# Patient Record
(demographics unavailable — no encounter records)

---

## 2025-01-13 NOTE — DEVELOPMENTAL MILESTONES
[Normal Development] : Normal Development [None] : none [Cries with discomfort] : cries with discomfort [Makes brief eye contact] : makes brief eye contact [Calms to adult voice] : calms to adult voice [Reflexively moves arms and legs] : reflexively moves arms and legs [Holds fingers closed] : holds fingers closed [Turns head to side when on stomach] : turns head to side when on stomach [Grasps reflexively] : grasp reflexively

## 2025-01-13 NOTE — HISTORY OF PRESENT ILLNESS
[Born at ___ Wks Gestation] : The patient was born at [unfilled] weeks gestation [C/S] : via  section [[facility]] : at [facility] [(1) _____] : [unfilled] [(5) _____] : [unfilled] [BW: _____] : weight of [unfilled] [Length: _____] : length of [unfilled] [HC: _____] : head circumference of [unfilled] [DW: _____] : Discharge weight was [unfilled] [Age: ___] : [unfilled] year old mother [G: ___] : G [unfilled] [P: ___] : P [unfilled] [RSV vaccine] : RSV vaccine received by mother at least 14 days prior to delivery [GBS] : GBS positive [Rubella (Immune)] : Rubella immune [MBT: ____] : MBT - [unfilled] [Other: ____] : [unfilled] [None] : There are no risk factors [GDM] : GDM [TcB: _____] : Transcutaneous Bilirubin [unfilled] mg/dL [Yes] : Yes [Breast milk] : breast milk [Formula ___ oz/feed] : [unfilled] oz of formula per feed [Hours between feeds ___] : Child is fed every [unfilled] hours [Normal] : Normal [___ voids per day] : [unfilled] voids per day [Frequency of stools: ___] : Frequency of stools: [unfilled]  stools [Yellow] : yellow [Loose] : loose consistency [Seedy] : seedy [In Bassinet/Crib] : sleeps in bassinet/crib [On back] : sleeps on back [No] : No cigarette smoke exposure [Water heater temperature set at <120 degrees F] : Water heater temperature set at <120 degrees F [Rear facing car seat in back seat] : Rear facing car seat in back seat [Carbon Monoxide Detectors] : Carbon monoxide detectors at home [Smoke Detectors] : Smoke detectors at home. [Hepatitis B Vaccine Given] : Hepatitis B vaccine given [NO] : No [HepBsAG] : HepBsAg negative [HIV] : HIV negative [HepC] : Hepatitis C negative [VDRL/RPR (Reactive)] : VDRL/RPR nonreactive [Loose bedding, pillow, toys, and/or bumpers in crib] : no loose bedding, pillow, toys, and/or bumpers in crib [Exposure to electronic nicotine delivery system] : No exposure to electronic nicotine delivery system [de-identified] : ameena [FreeTextEntry1] : This is a 6-day-old female patient that is here today for routine visit following hospital discharge.Mom is attempting to breast-feed and is having difficulty with infant latching on but is supplementing with 1-1/2 ounces of formula after each attempt.  Mom states that the infant is stooling and then has been stooling approximately 3-4 times yellow seedy stools.

## 2025-01-13 NOTE — PHYSICAL EXAM
[Alert] : alert [Normocephalic] : normocephalic [Flat Open Anterior [place]] : flat open anterior fontanelle [PERRL] : PERRL [Red Reflex Bilateral] : red reflex bilateral [Normally Placed Ears] : normally placed ears [Auricles Well Formed] : auricles well formed [Clear Tympanic membranes] : clear tympanic membranes [Light reflex present] : light reflex present [Bony structures visible] : bony structures visible [Patent Auditory Canal] : patent auditory canal [Nares Patent] : nares patent [Palate Intact] : palate intact [Uvula Midline] : uvula midline [Supple, full passive range of motion] : supple, full passive range of motion [Symmetric Chest Rise] : symmetric chest rise [Clear to Auscultation Bilaterally] : clear to auscultation bilaterally [Regular Rate and Rhythm] : regular rate and rhythm [S1, S2 present] : S1, S2 present [+2 Femoral Pulses] : +2 femoral pulses [Soft] : soft [Bowel Sounds] : bowel sounds present [Umbilical Stump Dry, Clean, Intact] : umbilical stump dry, clean, intact [Normal external genitalia] : normal external genitalia [Patent Vagina] : patent vagina [Patent] : patent [Normally Placed] : normally placed [No Abnormal Lymph Nodes Palpated] : no abnormal lymph nodes palpated [Symmetric Flexed Extremities] : symmetric flexed extremities [Startle Reflex] : startle reflex present [Suck Reflex] : suck reflex present [Rooting] : rooting reflex present [Palmar Grasp] : palmar grasp present [Plantar Grasp] : plantar reflex present [Symmetric Ranjit] : symmetric [place] [Acute Distress] : no acute distress [Icteric sclera] : nonicteric sclera [Discharge] : no discharge [Palpable Masses] : no palpable masses [Murmurs] : no murmurs [Tender] : nontender [Distended] : not distended [Hepatomegaly] : no hepatomegaly [Splenomegaly] : no splenomegaly [Clitoromegaly] : no clitoromegaly [Lebron-Ortolani] : negative Lebron-Ortolani [Spinal Dimple] : no spinal dimple [Tuft of Hair] : no tuft of hair [Jaundice] : not jaundice

## 2025-01-13 NOTE — PHYSICAL EXAM
[Alert] : alert [Normocephalic] : normocephalic [Flat Open Anterior [place]] : flat open anterior fontanelle [PERRL] : PERRL [Red Reflex Bilateral] : red reflex bilateral [Normally Placed Ears] : normally placed ears [Auricles Well Formed] : auricles well formed [Clear Tympanic membranes] : clear tympanic membranes [Light reflex present] : light reflex present [Bony structures visible] : bony structures visible [Patent Auditory Canal] : patent auditory canal [Nares Patent] : nares patent [Palate Intact] : palate intact [Uvula Midline] : uvula midline [Supple, full passive range of motion] : supple, full passive range of motion [Symmetric Chest Rise] : symmetric chest rise [Clear to Auscultation Bilaterally] : clear to auscultation bilaterally [Regular Rate and Rhythm] : regular rate and rhythm [S1, S2 present] : S1, S2 present [+2 Femoral Pulses] : +2 femoral pulses [Soft] : soft [Bowel Sounds] : bowel sounds present [Umbilical Stump Dry, Clean, Intact] : umbilical stump dry, clean, intact [Normal external genitalia] : normal external genitalia [Patent Vagina] : patent vagina [Patent] : patent [Normally Placed] : normally placed [No Abnormal Lymph Nodes Palpated] : no abnormal lymph nodes palpated [Symmetric Flexed Extremities] : symmetric flexed extremities [Startle Reflex] : startle reflex present [Suck Reflex] : suck reflex present [Rooting] : rooting reflex present [Palmar Grasp] : palmar grasp present [Plantar Grasp] : plantar reflex present [Symmetric Ranjit] : symmetric [place] [Acute Distress] : no acute distress [Icteric sclera] : nonicteric sclera [Discharge] : no discharge [Palpable Masses] : no palpable masses [Murmurs] : no murmurs [Tender] : nontender [Distended] : not distended [Hepatomegaly] : no hepatomegaly [Splenomegaly] : no splenomegaly [Clitoromegaly] : no clitoromegaly [Lebron-Ortolani] : negative Lebron-Ortolani [Spinal Dimple] : no spinal dimple [Tuft of Hair] : no tuft of hair [Jaundice] : not jaundice

## 2025-01-13 NOTE — HISTORY OF PRESENT ILLNESS
[Born at ___ Wks Gestation] : The patient was born at [unfilled] weeks gestation [C/S] : via  section [[facility]] : at [facility] [(1) _____] : [unfilled] [(5) _____] : [unfilled] [BW: _____] : weight of [unfilled] [Length: _____] : length of [unfilled] [HC: _____] : head circumference of [unfilled] [DW: _____] : Discharge weight was [unfilled] [Age: ___] : [unfilled] year old mother [G: ___] : G [unfilled] [P: ___] : P [unfilled] [RSV vaccine] : RSV vaccine received by mother at least 14 days prior to delivery [GBS] : GBS positive [Rubella (Immune)] : Rubella immune [MBT: ____] : MBT - [unfilled] [Other: ____] : [unfilled] [None] : There are no risk factors [GDM] : GDM [TcB: _____] : Transcutaneous Bilirubin [unfilled] mg/dL [Yes] : Yes [Breast milk] : breast milk [Formula ___ oz/feed] : [unfilled] oz of formula per feed [Hours between feeds ___] : Child is fed every [unfilled] hours [Normal] : Normal [___ voids per day] : [unfilled] voids per day [Frequency of stools: ___] : Frequency of stools: [unfilled]  stools [Yellow] : yellow [Loose] : loose consistency [Seedy] : seedy [In Bassinet/Crib] : sleeps in bassinet/crib [On back] : sleeps on back [No] : No cigarette smoke exposure [Water heater temperature set at <120 degrees F] : Water heater temperature set at <120 degrees F [Rear facing car seat in back seat] : Rear facing car seat in back seat [Carbon Monoxide Detectors] : Carbon monoxide detectors at home [Smoke Detectors] : Smoke detectors at home. [Hepatitis B Vaccine Given] : Hepatitis B vaccine given [NO] : No [HepBsAG] : HepBsAg negative [HIV] : HIV negative [HepC] : Hepatitis C negative [VDRL/RPR (Reactive)] : VDRL/RPR nonreactive [Loose bedding, pillow, toys, and/or bumpers in crib] : no loose bedding, pillow, toys, and/or bumpers in crib [Exposure to electronic nicotine delivery system] : No exposure to electronic nicotine delivery system [de-identified] : ameena [FreeTextEntry1] : This is a 6-day-old female patient that is here today for routine visit following hospital discharge.Mom is attempting to breast-feed and is having difficulty with infant latching on but is supplementing with 1-1/2 ounces of formula after each attempt.  Mom states that the infant is stooling and then has been stooling approximately 3-4 times yellow seedy stools.

## 2025-01-13 NOTE — DISCUSSION/SUMMARY
[Normal Growth] : growth [Normal Development] : developmental [No Elimination Concerns] : elimination [Continue Regimen] : feeding [No Skin Concerns] : skin [Normal Sleep Pattern] : sleep [None] : no known medical problems [Anticipatory Guidance Given] : Anticipatory guidance addressed as per the history of present illness section [No Medications] : ~He/She~ is not on any medications [Parent/Guardian] : Parent/Guardian [FreeTextEntry1] : This is a 6 day old  female infant  here for their initial visit to our practice following hospital discharge. I Recommended  breastfeeding, 8-12 feedings per day. Mother should continue prenatal vitamins and avoid alcohol. If formula is needed, recommend iron-fortified formula every 2-3 hrs.until her breast milk is established . Infant to be started on Vit D iml /day   When in car, patient should be in rear-facing car seat in back seat. Air dry umbilical stump and continue sponge bathing 3-4 times per week or as needed until cord falls off. Put baby to sleep on back, in own crib with no loose or soft bedding. Limit baby's exposure to others, especially those with fever or unknown vaccine status. Child's physical examination today is within normal limits mild icteric tinge noted bilirubin 7.7.  If infant is Jaundice , place infant in indirect  sunlight to help with alleviating the jaundice . Infant to follow up in 48 hrs for a weight and a color check

## 2025-01-16 NOTE — HISTORY OF PRESENT ILLNESS
[FreeTextEntry6] : Taking 1.5 to 2.5 oz enfamil or breast milk. Mom not getting much when she pumps. Prefers one side over the other when taking breast. Taking vitamin D. She is eating every 2 hours or so. Passing plenty of urine and stool diapers, stools have transitioned to yellow/seedy. She is up over 3 oz since her visit 3 days ago.  Parents have concerns about intermittent tachypneic episodes that seem to happen more when she is just fed and drifting off to sleep. She will breathe faster for sustained amounts of time (varying between 20 seconds to 2 mins to 2 hours at the most). They tried suctioning her nares due to slight congestion appreciated. She has not had fever, parents are checking temps. No hx of respiratory isses after birth.  She also makes a squeaking sound when she is crying towards the end of the cry.

## 2025-01-16 NOTE — REVIEW OF SYSTEMS
[Negative] : Genitourinary [Nasal Congestion] : nasal congestion [Tachypnea] : tachypneic [Fussy] : not fussy [Fever] : no fever [Cough] : no cough [Spitting Up] : no spitting up

## 2025-01-16 NOTE — DISCUSSION/SUMMARY
[FreeTextEntry1] : 9 day female here for weight and color check. Excellent weight gain, bilirubin trending down nicely.  Suspect laryngomalacia or tracheomalacia based on the squeaking sounds she is making when crying (noted in a video mom has as well as noted in the office too). The transient/intermittent tachypnea is likely also related and can be resolved with position change. She is usually sleeping on her back when this happens. Advised suctioning nares and moving her to on parent's chest (belly to belly) to see if this snaps her out of it. "Floppy vocal cord" is my suspicion. Lungs are clear. If this tachypnea persists will see if pulmonary referral is needed.  Total time dedicated to this patient's visit includes preparing to see patient (reviewing chart, any pertinent labs/consults, etc.), obtaining and/or reviewing separately obtained history from patient and parent, performing medical examination, evaluation, counseling and educating patient/parent, ordering any needed medications and/or labs, documenting clinical information in the electronic record, reviewing any results subsequent to the orders placed during visit and discussing with patient/parent. Total time spent: 30 minutes.

## 2025-01-16 NOTE — PHYSICAL EXAM
[NL] : warm, clear [Tired appearing] : not tired appearing [Lethargic] : not lethargic [Toxic] : not toxic [Stridor] : no stridor [FreeTextEntry7] : intermittent tachypnea noted when she was in mom's arms sleeping/smiling in her sleep, it stopped immediately when changing her position and bringing her to the table to examine. No belly breathing no retractions etc [FreeTextEntry9] : umbilical cord dry intact

## 2025-01-21 NOTE — PHYSICAL EXAM
[Clear to Auscultation Bilaterally] : clear to auscultation bilaterally [NL] : warm, clear [FreeTextEntry4] : Mom concerned about noisy breathing not present at this exam from description or from video shown his normal  nasal congestion alleviated with aspirator.  Mom advised to do this before each feed. [de-identified] : Sucking blister noted on upper lip.  Mom concerned whether this could be infectious advised mom that this is very common in babies of this age from sucking bottles nipples [FreeTextEntry8] : Questionable 1 out of 2 murmur audible.  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  Referred to cardiology for further evaluation of audible murmur. [FreeTextEntry7] : Respiratory rate 98% room air infant appears to be breathing comfortably there are no rales rhonchi or wheezing.  Infant will go through short bouts of tachypnea parents were reassured that this is normal for this age that they will have a period of transient tachypnea of the .  Since child is pink not labored and these episodes are brief is still considered normal at this age.

## 2025-01-21 NOTE — DISCUSSION/SUMMARY
[FreeTextEntry1] : This is a 14-day-old baby that is here today for evaluation of lesion on her upper lip and intermittent rapid breathing.  Mom is also concerned about occasional nasal congestion.  Infant is otherwise eating well color is pink and she remains afebrile.  Her physical examination today is within normal limits .  The small lesion on the upper lip is consistent with a sucking blister.  Parents were advised that this is common in babies and no further intervention needed at this time since will resolve in time.  Regarding occasional nasal congestion child is breathing well during exam no sign of congestion at today's visit.  Advised mom however to use nasal aspirator prior to feeds to help with the nasal congestion if present.  Regarding the episodes of tachypnea observed briefly during this exam otherwise child is breathing very comfortably no retractions noted .  Mom showed episodes of child having rapid breathing these episodes are intermittent and short lasting.  And child appears restful and in no distress.  Mom is advised that this is consistent with transient tachypnea of the  and tends to resolve over the next few weeks.  Will monitor for present time.  Mom is concerned about possible cardiac issues as cause for this episodes of tachypnea she herself has cardiac issues and wishes to have child evaluated.  On exam there is a questionable 1 out of 2 murmur audible therefore child is being referred to cardiology for further evaluation.  Regarding feeds parents were advised to increase to 3 ounces per feed every 3-4 hours.  Mom is still attempting to increase her milk supply she was advised to nurse minimum 8-10 times a day in order to establish a milk supply sufficient enough to nurse baby.  Patient has follow-up visit in 48 hours for lactation consultant. Total time dedicated to this patient's visit includes preparing to see patient  obtaining and/or reviewing separately obtained history from patient and parent,  discussing symptoms ,  physical exam and medication recommendations Time :30

## 2025-01-21 NOTE — PHYSICAL EXAM
[Clear to Auscultation Bilaterally] : clear to auscultation bilaterally [NL] : warm, clear [FreeTextEntry4] : Mom concerned about noisy breathing not present at this exam from description or from video shown his normal  nasal congestion alleviated with aspirator.  Mom advised to do this before each feed. [de-identified] : Sucking blister noted on upper lip.  Mom concerned whether this could be infectious advised mom that this is very common in babies of this age from sucking bottles nipples [FreeTextEntry7] : Respiratory rate 98% room air infant appears to be breathing comfortably there are no rales rhonchi or wheezing.  Infant will go through short bouts of tachypnea parents were reassured that this is normal for this age that they will have a period of transient tachypnea of the .  Since child is pink not labored and these episodes are brief is still considered normal at this age. [FreeTextEntry8] : Questionable 1 out of 2 murmur audible.  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  Referred to cardiology for further evaluation of audible murmur.

## 2025-01-21 NOTE — HISTORY OF PRESENT ILLNESS
[FreeTextEntry6] : 14 d/o patient presents today for congestion, cough, and heavy/fast breathing, x4 days. Patient also has something on upper lip that parents are concerned about. No fevers. Elevated temperature at 99.3F in office.

## 2025-01-23 NOTE — CONSULT LETTER
[Today's Date] : [unfilled] [Name] : Name: [unfilled] [] : : ~~ [Today's Date:] : [unfilled] [Dear  ___:] : Dear Dr. [unfilled]: [Consult] : I had the pleasure of evaluating your patient, [unfilled]. My full evaluation follows. [Consult - Single Provider] : Thank you very much for allowing me to participate in the care of this patient. If you have any questions, please do not hesitate to contact me. [Sincerely,] : Sincerely, [FreeTextEntry4] : Neisha Younger MD [FreeTextEntry5] : [address], [place] [address] [de-identified] : Anuj Doran MD, FAAP  and Systems Chief, Pediatric Cardiology [facility] [address], [address] [place] [address] Office: [phone] Fax: [phone]

## 2025-01-23 NOTE — PHYSICAL EXAM
[General Appearance - Alert] : alert [General Appearance - In No Acute Distress] : in no acute distress [General Appearance - Well Nourished] : well nourished [General Appearance - Well Developed] : well developed [General Appearance - Well-Appearing] : well appearing [Appearance Of Head] : the head was normocephalic [Facies] : there were no dysmorphic facial features [Sclera] : the conjunctiva were normal [Outer Ear] : the ears and nose were normal in appearance [Examination Of The Oral Cavity] : mucous membranes were moist and pink [Normal Chest Appearance] : the chest was normal in appearance [Apical Impulse] : quiet precordium with normal apical impulse [Heart Rate And Rhythm] : normal heart rate and rhythm [Heart Sounds] : normal S1 and S2 [Heart Sounds Gallop] : no gallops [Heart Sounds Pericardial Friction Rub] : no pericardial rub [Edema] : no edema [Arterial Pulses] : normal upper and lower extremity pulses with no pulse delay [Heart Sounds Click] : no clicks [Capillary Refill Test] : normal capillary refill [Bowel Sounds] : normal bowel sounds [Abdomen Soft] : soft [Nondistended] : nondistended [Abdomen Tenderness] : non-tender [Nail Clubbing] : no clubbing  or cyanosis of the fingers [Motor Tone] : normal muscle strength and tone [Cervical Lymph Nodes Enlarged Anterior] : The anterior cervical nodes were normal [Cervical Lymph Nodes Enlarged Posterior] : The posterior cervical nodes were normal [] : no rash [Skin Lesions] : no lesions [Skin Turgor] : normal turgor [Demonstrated Behavior - Infant Nonreactive To Parents] : interactive [Mood] : mood and affect were appropriate for age [Demonstrated Behavior] : normal behavior [III] : a grade 3/6   [LUSB] : LUSB [Back] : the murmur was transmitted to the back

## 2025-01-23 NOTE — PROCEDURE
[FreeTextEntry1] : Flexible fiberoptic laryngoscopy [FreeTextEntry2] : Noisy breathing [FreeTextEntry3] :  After verbal consent was obtained, the flexible fiberoptic laryngoscope was passed and the following was seen:   Nasal passage: clear without lesions or drainage, choanae patent bilaterally Nasopharynx: Adenoids with <10% obstruction, normal closure of the velopharyngeal sphincter Supraglottis: Normal shape and mucosalization of the epiglottis, normal appearing aryepiglottic folds and arytenoids Glottis: Normal mobility of right and left vocal cords. No vocal cord lesions. Subglottis: Subglottis as able to visualize appears patent. Hypopharynx: Normal appearing post-cricoid region without pooling of secretions.   The patient tolerated the procedure well without complications.

## 2025-01-23 NOTE — CONSULT LETTER
[Dear  ___] : Dear  [unfilled], [Courtesy Letter:] : I had the pleasure of seeing your patient, [unfilled], in my office today. [Please see my note below.] : Please see my note below. [Sincerely,] : Sincerely, [FreeTextEntry3] : Jocelynn Freedman [place] [address] Tel [phone] Fax [phone]

## 2025-01-23 NOTE — REVIEW OF SYSTEMS
[Nl] : no feeding issues at this time. [Breastmilk] : Breastmilk ~M [___ Formula] : [unfilled] Formula  [___ ounces/feeding] : ~MATT pino/feeding [___ Times/day] : [unfilled] times/day [Acting Fussy] : not acting ~L fussy [Fever] : no fever [Wgt Loss (___ Lbs)] : no recent weight loss [Pallor] : not pale [Discharge] : no discharge [Redness] : no redness [Nasal Discharge] : no nasal discharge [Nasal Stuffiness] : no nasal congestion [Stridor] : no stridor [Cyanosis] : no cyanosis [Edema] : no edema [Diaphoresis] : not diaphoretic [Tachypnea] : not tachypneic [Wheezing] : no wheezing [Cough] : no cough [Being A Poor Eater] : not a poor eater [Vomiting] : no vomiting [Diarrhea] : no diarrhea [Decrease In Appetite] : appetite not decreased [Fainting (Syncope)] : no fainting [Dec Consciousness] :  no decrease in consciousness [Seizure] : no seizures [Hypotonicity (Flaccid)] : not hypotonic [Refusal to Bear Wgt] : normal weight bearing [Puffy Hands/Feet] : no hand/feet puffiness [Rash] : no rash [Hemangioma] : no hemangioma [Jaundice] : no jaundice [Wound problems] : no wound problems [Bruising] : no tendency for easy bruising [Swollen Glands] : no lymphadenopathy [Enlarged [place]] : the fontanelle was not enlarged [Hoarse Cry] : no hoarse cry [Failure To Thrive] : no failure to thrive [Vaginal Discharge] : no vaginal discharge [Ambiguous Genitals] : genitals not ambiguous [Dec Urine Output] : no oliguria

## 2025-01-23 NOTE — ASSESSMENT
[FreeTextEntry1] : We reviewed laryngoscopy findings which demonstrate normal supraglottic and glottic structures. No significant laryngomalacia noted on exam today. Overall well-appearing . Continue to monitor weight gain. Follow up with ENT as needed.

## 2025-01-23 NOTE — PHYSICAL EXAM
[Normal Gait and Station] : normal gait and station [Normal muscle strength, symmetry and tone of facial, head and neck musculature] : normal muscle strength, symmetry and tone of facial, head and neck musculature [Normal] : no cervical lymphadenopathy [Exposed Vessel] : left anterior vessel not exposed [Increased Work of Breathing] : no increased work of breathing with use of accessory muscles and retractions [de-identified] : no tongue tie

## 2025-01-23 NOTE — HISTORY OF PRESENT ILLNESS
[FreeTextEntry1] : I had the pleasure of seeing PHIL VERONICA in the cardiology office on 01/23/2025 for an initial consultation. As you know, PHIL is a 2 week old girl who was referred to cardiology for a heart murmur. The murmur was first diagnosed during a routine pediatric visit a few days ago. This had not been heard in the nursery. PHIL was not ill or febrile at the time of that visit.   She has been feeding by breast but does at time take longer to feed. Additionally she has episodically increased rate and work of breathing. This is unpredictable and can last for a while but does resolve. She has seen ENT with no etiology.  There has been no cyanosis, excessive diaphoresis, unexplained irritability, or syncope.  There has been no chest pain, cyanosis, excessive diaphoresis, unexplained irritability, or syncope.  There is no history of sudden early death, syncope, pacemakers cardiomyopathy or heart transplant or other early onset CV issues in the family. Mom had SVT as an adult.

## 2025-01-23 NOTE — REASON FOR VISIT
[Initial Evaluation] : an initial evaluation for [Parents] : parents [FreeTextEntry2] : noisy breathing

## 2025-01-23 NOTE — DISCUSSION/SUMMARY
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 The murmur is due to physiologic peripheral pulmonic stenosis (PPS).  I discussed at length with the family that this is a normal finding in infants, and that the murmur of PPS typically resolves around 6-9 months of age. There are rare exceptions to this and we will follow-up in a year.  I explained that it is hemodynamically insignificant and is not the cause of her symptom of rapid breathing. Of note we did hear a bit of wheezing today albeit in the setting of having had an ENT scope this morning.   In addition to the PPS we did make incidental note today of an anomalous origin of the right coronary artery from the left cusp.  This condition is now discovered more frequently around the world as echo machine resolution is constantly improving and protocols are more sophisticated. There is controversy surrounding the management of children, adolescents, and young adults with anomalous RCA (AORCA) who are diagnosed serendipitously. While we know that AORCA is associated with a slight increased risk of sudden death in adolescents and young adults, many individuals with this anomaly do not experience ischemic symptoms or show any evidence of ischemia and the anomalous coronary is diagnosed serendipitously later in life. Ischemic events associated with anomalous coronary arteries almost always occur during or just after significant exertion. We currently do not know how to risk stratify those with AORCA who may develop true ischemic symptoms or signs at a young age from those asymptomatic individuals who would never have known they had this, had an imaging test not been performed.   We generally do not recommend elective repair of an asymptomatic child with AORCA and we do not generally repair anomalous right coronary until school age (i.e., > 10 years of age). However, the risks of surgery for a child with asymptomatic AORCA might outweigh the lifetime risk of an adverse event from it. As well, there are risks associated with any surgery and also a surgery for which we do not know the long-term outcome.  I explained to the family at length my findings as above. The family verbalized understanding, and all questions were answered.   From a cardiac standpoint there are no physical limitations, no contraindications to any medications she should require, no cardiac contraindications to anesthesia or surgical procedures, and no requirement for SBE prophylaxis prior to procedures.   From a CV perspective all routine vaccinations including flu vaccination are recommended  Follow-up is recommended in 1 year including an ECG and Echocardiogram.  [Needs SBE Prophylaxis] : [unfilled] does not need bacterial endocarditis prophylaxis [May participate in all age-appropriate activities] : [unfilled] May participate in all age-appropriate activities. [RSV prophylaxis is recommended] : RSV prophylaxis is recommended. [Influenza vaccine is recommended] : Influenza vaccine is recommended

## 2025-01-23 NOTE — CARDIOLOGY SUMMARY
[Today's Date] : [unfilled] [FreeTextEntry1] : Normal sinus rhythm, normal QRS axis, normal intervals, possible LVH  no pre-excitation, no ST segment or T wave abnormalities.  [FreeTextEntry2] : Personal preliminary review of the echocardiogram revealed normal cardiac architecture, and normal cardiac anatomy. 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 Cardiac dimensions and biventricular function were normal. There was no pericardial effusion. Final report pending.

## 2025-01-23 NOTE — HISTORY OF PRESENT ILLNESS
[de-identified] : Janet is a 16 day old ex-FT girl here for evaluation of noisy breathing.  Here with parents who provide history.   Parents have noticed intermittent fast paced breathing since home from the hospital.  Can occur after feeds but also can happen at random times throughout the day.  PCP told them it was transient tachypnea of .  Parents notice noisy squeak at the end of breathing at times, particularly when crying or worked up.  No retractions. No increased work of breathing. No apneas.  Eating well - both from bottle and breast.  Gaining weight appropriately.   No other otolaryngology concerns today.

## 2025-01-30 NOTE — PHYSICAL EXAM
[Playful] : playful [Wheezing] : no wheezing [Rales] : no rales [Tachypnea] : no tachypnea [NL] : warm, clear [FreeTextEntry1] : smiling [de-identified] : pink

## 2025-01-30 NOTE — PHYSICAL EXAM
[Playful] : playful [Wheezing] : no wheezing [Rales] : no rales [Tachypnea] : no tachypnea [NL] : warm, clear [FreeTextEntry1] : smiling [de-identified] : pink

## 2025-01-30 NOTE — REVIEW OF SYSTEMS
[Fussy] : not fussy [Fever] : no fever [Nasal Congestion] : nasal congestion [Appetite Changes] : no appetite changes [Spitting Up] : spitting up [Negative] : Genitourinary [FreeTextEntry1] : sneezing

## 2025-01-30 NOTE — DISCUSSION/SUMMARY
[FreeTextEntry1] : 23 day female with noisy breathing/nasal congestion. Reflux is likely playing a role in her congestion but the spitting up does not seem to bother her in the least therefore not considering medication or GI referral at this time. She is gaining weight very well. Advised trialing a slower flow nipple (currently on size 2) and paced bottle feeding discussed. Not looking ill at all today, I have no suspicion for infectious process or illness. Follow up if worsening symptoms. Reassurance provided.

## 2025-01-30 NOTE — HISTORY OF PRESENT ILLNESS
[FreeTextEntry6] : 23 d/o presents with ongoing nasal congestion (mom thinks worse, dad thinks she is better/fine), occasional cough, sneezing. Afebrile in office. She gained 1 lb 3 oz since her last office visit 9 days ago. She is using a size 2 [place] nipple. Taking EBM and formula by bottle. She spits up sometimes but is not bothered by it. The noisy breathing is alleviated by change in positioning. Mom is also trying steam showers and suctioning her. One time got a bloody piece of nasal discharge, mostly whitish but sometimes a little green too. No one sick in the home, no fevers. She is happy and smiling, not arching or bothered by any of the spitting up. Mom sometimes hears a whistling sound she is worried could be wheezing.

## 2025-02-07 NOTE — PHYSICAL EXAM
[Alert] : alert [Normocephalic] : normocephalic [Flat Open Anterior [place]] : flat open anterior fontanelle [PERRL] : PERRL [Red Reflex Bilateral] : red reflex bilateral [Normally Placed Ears] : normally placed ears [Auricles Well Formed] : auricles well formed [Clear Tympanic membranes] : clear tympanic membranes [Light reflex present] : light reflex present [Bony landmarks visible] : bony landmarks visible [Nares Patent] : nares patent [Palate Intact] : palate intact [Uvula Midline] : uvula midline [Supple, full passive range of motion] : supple, full passive range of motion [Symmetric Chest Rise] : symmetric chest rise [Clear to Auscultation Bilaterally] : clear to auscultation bilaterally [Regular Rate and Rhythm] : regular rate and rhythm [S1, S2 present] : S1, S2 present [+2 Femoral Pulses] : +2 femoral pulses [Bowel Sounds] : bowel sounds present [Normal external genitailia] : normal external genitalia [Patent Vagina] : vagina patent [Normally Placed] : normally placed [Symmetric Flexed Extremities] : symmetric flexed extremities [Startle Reflex] : startle reflex present [Suck Reflex] : suck reflex present [Rooting] : rooting reflex present [Palmar Grasp] : palmar grasp reflex present [Plantar Grasp] : plantar grasp reflex present [Symmetric [place]] : symmetric [place] [Soft] : soft [Non Tender] : non tender [Mobile] : mobile [< 1 cm Lymph Nodes Palpated in the following Regions:] : <1 cm lymph nodes palpated in the following regions: [Inguinal] : inguinal [Acute Distress] : no acute distress [Discharge] : no discharge [Palpable Masses] : no palpable masses [Murmurs] : no murmurs [Tender] : nontender [Distended] : not distended [Hepatomegaly] : no hepatomegaly [Splenomegaly] : no splenomegaly [Clitoromegaly] : no clitoromegaly [Lebron-Ortolani] : negative Lebron-Ortolani [Spinal Dimple] : no spinal dimple [Tuft of Hair] : no tuft of hair [Jaundice] : no jaundice [Rash and/or lesion present] : no rash/lesion [FreeTextEntry9] : small reducible umbilical hernia

## 2025-02-07 NOTE — DISCUSSION/SUMMARY
[Normal Growth] : growth [Normal Development] : development  [No Elimination Concerns] : elimination [Continue Regimen] : feeding [No Skin Concerns] : skin [Normal Sleep Pattern] : sleep [None] : no medical problems [Anticipatory Guidance Given] : Anticipatory guidance addressed as per the history of present illness section [Parental Well-Being] : parental well-being [Family Adjustment] : family adjustment [Feeding Routines] : feeding routines [Infant Adjustment] : infant adjustment [Safety] : safety [Age Approp Vaccines] : Age appropriate vaccines administered [No Medications] : ~He/She~ is not on any medications [Parent/Guardian] : Parent/Guardian [] : The components of the vaccine(s) to be administered today are listed in the plan of care. The disease(s) for which the vaccine(s) are intended to prevent and the risks have been discussed with the caretaker.  The risks are also included in the appropriate vaccination information statements which have been provided to the patient's caregiver.  The caregiver has given consent to vaccinate. [FreeTextEntry1] : 1 month female here for well visit. Normal growth and development observed unless otherwise listed. Recommend exclusive breastfeeding, 8-12 feedings per day. Mother should continue prenatal vitamins and avoid alcohol. If formula is needed, recommend iron-fortified formulations, 2-4 oz every 2-3 hrs. When in car, patient should be in rear-facing car seat in back seat. Put baby to sleep on back, in own crib with no loose or soft bedding. Help baby to develop sleep and feeding routines. May offer pacifier if needed. Start tummy time when awake. Limit baby's exposure to others, especially those with fever or unknown vaccine status. Parents counseled to call if temperature >100.4 degrees F. Return to office for 2 month well visit or sooner if needed.   Vaccine Information Sheet(s) given for appropriate vaccines. The components of the vaccine(s) to be administered today are listed in the plan of care. We discussed common side effects and education on the vaccine was provided including the disease(s) for which the vaccine(s) are intended to prevent as well as any risks. Denies any questions. Consent was given to vaccinate. Hep B given in right thigh.  Has pulmonologist appt next week to follow up on the intermittent tachypnea/noisy breathing. Has already seen ENT and cardiology.  Refer to surgery to follow up on left inguinal hernia (parents have noticed it flare back up and reduce since the ER visit a few days ago. Photos shown to me. Not currently appreciated but small lymph node that was noted on ultrasound is palpable in left inguinal area. Umbilical hernia noted. S/s of when to report to ER discussed.

## 2025-02-07 NOTE — HISTORY OF PRESENT ILLNESS
[Normal] : Normal [No] : No cigarette smoke exposure [Water heater temperature set at <120 degrees F] : Water heater temperature set at <120 degrees F [Rear facing car seat in back seat] : Rear facing car seat in back seat [Carbon Monoxide Detectors] : Carbon monoxide detectors at home [Smoke Detectors] : Smoke detectors at home. [Parents] : parents [Formula ___ oz/feed] : [unfilled] oz of formula per feed [Hours between feeds ___] : Child is fed every [unfilled] hours [___ voids per day] : [unfilled] voids per day [Frequency of stools: ___] : Frequency of stools: [unfilled]  stools [Green/brown] : green/brown [Yellow] : yellow [Pasty] : pasty [Loose] : loose consistency [In Bassinet/Crib] : sleeps in bassinet/crib [On back] : sleeps on back [Pacifier use] : Pacifier use [Co-sleeping] : no co-sleeping [Loose bedding, pillow, toys, and/or bumpers in crib] : no loose bedding, pillow, toys, and/or bumpers in crib [Exposure to electronic nicotine delivery system] : No exposure to electronic nicotine delivery system [At risk for exposure to TB] : Not at risk for exposure to Tuberculosis  [de-identified] : Primarily 85% formula. On enfamil formula blue label. Some breastmilk [FreeTextEntry1] : 1 month old female here for well visit. Denies any specialist visits, ER visits, hospitalizations or serious injuries since last well visit unless listed below.  Went to ER a few days ago for left groin mass. Possible self-resolved left inguinal hernia vs ultrasound suggested a lymph node possible as well. Has pulmonologist appointment next week ENT-- noisy breathing, did not feel she had laryngomalacia Cardiology-- Sent due to intermittent tachypnea but also a possible murmur. Has an innocent murmur due to peripheral pulmonic stenosis of the , physiologic and not a concern. 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 Follow up in 1 year. No restrictions

## 2025-02-07 NOTE — DEVELOPMENTAL MILESTONES
[Normal Development] : Normal Development [Not Passed] : not passed [FreeTextEntry1] : Has a therapist, had anxiety before baby but more anxiety now, considering seeing psychiatrist for medication. Encouraged mom to do so if needed and to reach out to us for assistance [FreeTextEntry2] : 12

## 2025-02-10 NOTE — CONSULT LETTER
[Dear  ___] : Dear  [unfilled], [Consult Letter:] : I had the pleasure of evaluating your patient, [unfilled]. [Please see my note below.] : Please see my note below. [Consult Closing:] : Thank you very much for allowing me to participate in the care of this patient.  If you have any questions, please do not hesitate to contact me. [Sincerely,] : Sincerely, [FreeTextEntry3] : Mitzi Conde NP

## 2025-02-10 NOTE — REVIEW OF SYSTEMS
[Nasal Congestion] : nasal congestion [Tachypnea] : tachypneic [Reflux] : reflux [Immunizations are up to date] : Immunizations are up to date [Fever] : no fever [Fatigue] : no fatigue [Chills] : no chills [Poor Appetite] : no poor appetite [Frequent URIs] : no frequent upper respiratory infections [Snoring] : no snoring [Apnea] : no apnea [Frequent Croup] : no frequent croup [Sinus Problems] : no sinus problems [Postnasl Drip] : no postnasal drip [Recurrent Ear Infections] : no recurrent ear infections [Heart Disease] : no heart disease [Edema] : no edema [Diaphoresis] : not diaphoretic [Pulmonary Hypertension] : pulmonary hypertension [Cough] : no cough [Bronchiolitis] : no bronchiolitis [Pneumonia] : no pneumonia [Hemoptysis] : no hemoptysis [Seizure] : no seizures [Failure To Thrive] : no failure to thrive [FreeTextEntry5] : b/l PPS. Anomalous aortic origin of the RCA [FreeTextEntry6] : Possible wheeze heard by mom  [FreeTextEntry7] : No overt s/sx of dysphagia, reflux symptoms improving [de-identified] : cradle cap

## 2025-02-10 NOTE — PHYSICAL EXAM
[Well Nourished] : well nourished [Well Developed] : well developed [Alert] : ~L alert [Active] : active [Normal Breathing Pattern] : normal breathing pattern [No Respiratory Distress] : no respiratory distress [No Allergic Shiners] : no allergic shiners [No Drainage] : no drainage [No Conjunctivitis] : no conjunctivitis [Nasal Mucosa Non-Edematous] : nasal mucosa non-edematous [No Nasal Drainage] : no nasal drainage [No Sinus Tenderness] : no sinus tenderness [No Oral Pallor] : no oral pallor [No Oral Cyanosis] : no oral cyanosis [No Stridor] : no stridor [Absence Of Retractions] : absence of retractions [Symmetric] : symmetric [Good Expansion] : good expansion [No Acc Muscle Use] : no accessory muscle use [Good aeration to bases] : good aeration to bases [Equal Breath Sounds] : equal breath sounds bilaterally [No Crackles] : no crackles [No Rhonchi] : no rhonchi [No Wheezing] : no wheezing [Normal Sinus Rhythm] : normal sinus rhythm [Soft, Non-Tender] : soft, non-tender [Full ROM] : full range of motion [No Clubbing] : no clubbing [Capillary Refill < 2 secs] : capillary refill less than two seconds [No Cyanosis] : no cyanosis [No Contractures] : no contractures [No Abnormal Focal Findings] : no abnormal focal findings [Normal Muscle Tone And Reflexes] : normal muscle tone and reflexes [No Birth Marks] : no birth marks [FreeTextEntry3] : normal external exam  [FreeTextEntry6] : periodic breathing during feed, will breathe faster for 5 seconds and slow rate to normal. Comfortable throughout feed, no retractions [FreeTextEntry9] : +inguinal hernia  [de-identified] : mottled skin [de-identified] : easily comforted in parents arms [de-identified] : +cradle cap

## 2025-02-10 NOTE — ASSESSMENT
[FreeTextEntry1] : PHIL VERONICA is a 1 month old female born at 38 weeks via . No complications at birth, discharged home from the hospital with mother, no NICU stay. Did not require respiratory support. NBS normal. Since discharge from the hospital, mother notes intermittent periods of tachypnea and noisy breathing with possible wheezing, mainly with feeds and shortly after. Can occur in periods of deep sleep as well. Overall, parents believe her episodic breathing and episodes of tachypnea are improving over time.   Noisy breathing: Evaluated by ENT at the end of January for noisy breathing episodes. Exam including in office scope was unremarkable. No increased WOB or retractions. No apneas or color changes. She is eating well and gaining weight. Cannot rule out mild malacia. Will order baseline CXR and Airway Fluoroscopy to evaluate for dynamic collapse of the airway. As the baby is growing and gaining weight, the malacia is likely mild and can be monitored conservatively over time. Noisy breathing can be associated with reflux as well, especially as episodes occur around feeds mainly. Working with PCP and lactation consultant on feeding regimen. Minimal projectile vomiting episodes. No evidence of reflux on ENT exam. Continue to work with PCP and Lactation to optimize feeding regimen- symptoms currently improved with slow flow nipple and frequent burps. Reviewed reflux precautions with parents today. Will order nebulizer to administer normal saline as needed for congestion, instructed parents to suction nares after treatments as needed.   Intermittent tachypnea around feeds and occasionally at night, consistent with periodic breathing of the , which is a normal variant. Fast breathing last for a few seconds, resolves, then can recur for up to 10 minutes at a time. No periods of persistent tachypnea. No retractions, color changes, or apneas. Parents have Owl pulse ox at home. Father notes that her O2 sats on average are between 96-99% awake and asleep. Once noted desat to 89% for a second that quickly self resolved. Cardio evaluation for murmur at the end of January reassuring. She has bilateral peripheral pulmonic stenosis, a normal variant in the  period. Echo also notable for anomalous origin of RCA. Normal EKG. Cardiologist provided reassurance that at this time, her symptoms and exam are not concerning for cardiac pathology. She is gaining weight appropriately. Able to watch her feed during our visit- took 2 2oz bottles with a break for burping. Periodic breathing with minimal belly breathing noted, no retractions. No stridor or wheeze appreciated. No color changes or apneas. Appears comfortable throughout feed. Provided reassurance to parents today. Mother is more concerned than father, states that overall she feels better about her periodic breathing and feels comfortable with her at home. Reviewed signs and symptoms of respiratory distress, when to seek care, ED return precautions today. Should her tachypnea become persistent, other causes including ILD will need to be considered.   Of note, mother has history of asthma. Possible wheeze heard in the past vs upper airway congestion. No recurrent cough or wheeze reported. Lungs clear on exam. Discussed risk factors for asthma/RAD with family and symptoms of RAD to look out for in the first year of life. Will monitor over time.   No history of recurrent respiratory infections making immune deficiency, cystic fibrosis and primary ciliary dyskinesia less likely at this time.   Discussed recommendations for flu and COVID 19 vaccines. Safety, side effects and efficacy reviewed.   Parents received plans well.   Follow up in 4-6 months.

## 2025-02-10 NOTE — HISTORY OF PRESENT ILLNESS
[FreeTextEntry1] : PHIL is a 1 month old F who presents to clinic today for initial evaluation of tachypnea.  NBS normal.    INITIAL VISIT: Visit Date: 02/10/2025 - ENT eval 25- referred for intermittent fast paced breathing since discharge home from the hospital. Will happen at rest or after feeds. Will occasionally squeak at end of breathing times, especially when crying or worked up. No increased WOB or retractions. No apneas. Eating well and gaining weight. Exam including in office scope unremarkable. Recs- follow up as needed - Cardio eval 25- referred for heart murmur. Breast feeding, takes longer to feed at times, with episodes of increased rate and work of breathing. Episodes are not predictable, will eventually resolve on their own. No cyanosis, excessive diaphoresis, unexplained irritability or syncope. EKG normal. Echo- Personal preliminary review of the echocardiogram revealed normal cardiac architecture, and normal cardiac anatomy. 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 Cardiac dimensions and biventricular function were normal. There was no pericardial effusion. Peripheral pulmonic stenosis is hemodynamically insignificant. No physical limitations, follow up in 1 year. - PCP visits for reflux symptoms and congestion over the last 2 weeks. Suggested slow flow nipple. Parents have switched and noticed less projectile vomiting episodes. Burping 1-2 times per feed  - Working with lactation consultant - Fast breathing sometimes with feeding, sometimes will linger after feeds. Possible wheeze vs. congestion appreciated as well  - Episodes will be intermittent for about 10 minutes and eventually resolve. Mom notices will happen sometimes when she is in a deep sleep. No apneas, color changes, use of accessory muscles - Has an Owl pulse ox monitor at home- will be around 96-99% when sleeping. Dad noticed 89% one time briefly for a second that self resolved - Parents report that intermittent fast breathing really is improving over time. Able to watch her feed during our visit- took 2 2oz bottles with a break for burping. Periodic breathing with minimal belly breathing noted, no retractions. No stridor or wheeze appreciated. No color changes or apneas. Appears comfortable throughout feed.    Age of Onset of Symptoms: PMH: inguinal hernia, intermittent tachypnea, reflux symptoms, congestion PSH: denies  Meds: denies  Birth Hx: 38 weeks via . No complications PCP/Specialists: Dr. Younger    Cough Hx: Triggers: None Allergies: None Hx of wheezing: possible  BERENICE Use: None Use of oral steroids: denies  ED/Hospitalizations: denies  Daytime cough: denies  Nighttime cough: denies  Respiratory symptoms with exercise: denies  Chest x-ray: denies    Family hx: Mom- asthma, SVT Dad- diabetes, high blood pressure, high cholesterol, anxiety, depression  Family hx of asthma: mom, maternal grandmother  Family hx of cystic fibrosis, autoimmune disease, recurrent respiratory infections: denies  Feeding issues, DEVIN: possible reflux symptoms, not on medications. No coughing, choking, gagging with feeds. Occasional spit ups and nasal congestion. Growing and gaining  HENRY Sx, Snoring/Gasping/Pauses: denies  Hx of Eczema: cradle cap  Hx of rhinitis, post nasal drip: occasional congestion after feeds  Hx of recurrent infections (ie: pneumonia, AOM, sinusitis): denies  Seen by pulmonologist before: denies    Vaccines UTD: yes  Influenza Vaccine: not eligible COVID-19 Vaccine: not eligible  H/o COVID19/RSV infection: denies    Modified Asthma Predictive Index (mAPI): 4 wheezing illnesses AND 1 major criteria Parental asthma: YES atopic dermatitis: NO Aeroallergen sensitization suspected: NO   OR   2 minor criteria Food sensitization: NO Peripheral blood eosinophilia = % Wheezing apart from colds: NO

## 2025-02-10 NOTE — REVIEW OF SYSTEMS
[Nasal Congestion] : nasal congestion [Tachypnea] : tachypneic [Reflux] : reflux [Immunizations are up to date] : Immunizations are up to date [Fever] : no fever [Fatigue] : no fatigue [Chills] : no chills [Poor Appetite] : no poor appetite [Frequent URIs] : no frequent upper respiratory infections [Snoring] : no snoring [Apnea] : no apnea [Frequent Croup] : no frequent croup [Sinus Problems] : no sinus problems [Postnasl Drip] : no postnasal drip [Recurrent Ear Infections] : no recurrent ear infections [Heart Disease] : no heart disease [Edema] : no edema [Diaphoresis] : not diaphoretic [Pulmonary Hypertension] : pulmonary hypertension [Cough] : no cough [Bronchiolitis] : no bronchiolitis [Pneumonia] : no pneumonia [Hemoptysis] : no hemoptysis [Seizure] : no seizures [Failure To Thrive] : no failure to thrive [FreeTextEntry5] : b/l PPS. Anomalous aortic origin of the RCA [FreeTextEntry6] : Possible wheeze heard by mom  [FreeTextEntry7] : No overt s/sx of dysphagia, reflux symptoms improving [de-identified] : cradle cap

## 2025-02-10 NOTE — SOCIAL HISTORY
[Mother] : mother [Father] : father [Bedroom] :  in bedroom [Basement] :  in basement  [Living Area] : in living area [Dog] : dog [Smokers in Household] : there are no smokers in the home

## 2025-02-10 NOTE — PHYSICAL EXAM
[Well Nourished] : well nourished [Well Developed] : well developed [Alert] : ~L alert [Active] : active [Normal Breathing Pattern] : normal breathing pattern [No Respiratory Distress] : no respiratory distress [No Allergic Shiners] : no allergic shiners [No Drainage] : no drainage [No Conjunctivitis] : no conjunctivitis [Nasal Mucosa Non-Edematous] : nasal mucosa non-edematous [No Nasal Drainage] : no nasal drainage [No Sinus Tenderness] : no sinus tenderness [No Oral Pallor] : no oral pallor [No Oral Cyanosis] : no oral cyanosis [No Stridor] : no stridor [Absence Of Retractions] : absence of retractions [Symmetric] : symmetric [Good Expansion] : good expansion [No Acc Muscle Use] : no accessory muscle use [Good aeration to bases] : good aeration to bases [Equal Breath Sounds] : equal breath sounds bilaterally [No Crackles] : no crackles [No Rhonchi] : no rhonchi [No Wheezing] : no wheezing [Normal Sinus Rhythm] : normal sinus rhythm [Soft, Non-Tender] : soft, non-tender [Full ROM] : full range of motion [No Clubbing] : no clubbing [Capillary Refill < 2 secs] : capillary refill less than two seconds [No Cyanosis] : no cyanosis [No Contractures] : no contractures [No Abnormal Focal Findings] : no abnormal focal findings [Normal Muscle Tone And Reflexes] : normal muscle tone and reflexes [No Birth Marks] : no birth marks [FreeTextEntry3] : normal external exam  [FreeTextEntry6] : periodic breathing during feed, will breathe faster for 5 seconds and slow rate to normal. Comfortable throughout feed, no retractions [FreeTextEntry9] : +inguinal hernia  [de-identified] : mottled skin [de-identified] : easily comforted in parents arms [de-identified] : +cradle cap

## 2025-02-10 NOTE — REASON FOR VISIT
[Initial Evaluation] : an initial evaluation of [Abnormal Lung Function] : abnormal lung function [GERD] : GERD [Mother] : mother [Father] : father [Medical Records] : medical records [FreeTextEntry3] : Tachypnea

## 2025-02-12 NOTE — ADDENDUM
[FreeTextEntry1] : Documented by Shakir Pérez acting as a scribe for Dr. Gomez on 02/12/2025.   All medical record entries made by the Scribe were at my, Dr. Gomez, direction and personally dictated by me on 02/12/2025. I have reviewed the chart and agree that the record accurately reflects my personal performances of the history, physical exam, assessment and plan. I have also personally directed, reviewed, and agree with the instructions.

## 2025-02-12 NOTE — ASSESSMENT
[FreeTextEntry1] : Janet is a 36 day old female with evidence of a reducible left inguinal hernia as well as an umbilical hernia. She is otherwise healthy and doing well without symptoms. I counseled the family and offered my reassurance, as the hernia is appreciated on exam today and remains easily reducible. Mom also presented a photograph that is convincing for a hernia. I educated mom and dad about this diagnosis and offered reassurance. I reviewed the role of surgery to repair the hernia and recommended we proceed with a laparoscopic left, possible right, inguinal hernia repair. I discussed the indications, risks, benefits and alternatives to the procedure. The risks discussed included but were not limited to bleeding, infection, injury to intra-abdominal/pelvic contents, and hernia recurrence. The need for general anesthesia was discussed and what this entails. I counseled them about the possibility of developing an incarcerated hernia and they know to bring Janet to the emergency room with any concerns. Also, I discussed the repair of the umbilical hernia at the same time as the laparoscopic approach used the umbilicus as entry point. Mom and dad have indicated their understanding and agree to proceed with surgery. We will schedule this electively. They know to contact me sooner with any further questions or concerns.

## 2025-02-12 NOTE — HISTORY OF PRESENT ILLNESS
[FreeTextEntry1] : Janet is a full term 36 day old female here today for surgical evaluation of a likely left inguinal hernia. Mom notes this protuberance has been appreciated since birth and comes and goes without any issues. Janet has not displayed any visible discomfort associated with the bulging. Nothing has been seen on the opposite side.  Of note also, she has a known asymptomatic umbilical hernia. She has not developed any fevers or changes in energy levels. She is feeding well without emesis. Janet is followed by cardiology for a murmur, but mom notes there are no contraindications for surgery. She was in the ED on 2/1, where an ultrasound failed to demonstrate a hernia, but a lymph node was seen. The ER docs felt that this was a left inguinal hernia clinically. Mom shows pictures illustrating this left groin bulge and also describes it well.

## 2025-02-12 NOTE — PHYSICAL EXAM
[NL] : grossly intact [No Incision] : no incision [Acute Distress] : no acute distress [Soft] : soft [Tender] : not tender [Distended] : not distended [Normal bowel sounds] : normal bowel sounds [Hepatosplenomegaly] : no hepatosplenomegaly [Normal external genitalia] : normal external genitalia [Rash] : no rash [Jaundice] : no jaundice [TextBox_37] : small and reducible umbilical hernia [TextBox_67] : Reducible left inguinal bulge consistent with inguinal hernia; normal right side

## 2025-02-12 NOTE — CONSULT LETTER
[Dear  ___] : Dear  [unfilled], [Please see my note below.] : Please see my note below. [Consult Closing:] : Thank you very much for allowing me to participate in the care of this patient.  If you have any questions, please do not hesitate to contact me. [Sincerely,] : Sincerely, [Courtesy Letter:] : I had the pleasure of seeing your patient, [unfilled], in my office today. [FreeTextEntry2] : Neisha Younger MD [FreeTextEntry3] : Walter Gomez MD Associate Professor of Surgery and Pediatrics [employer] at [facility] Pediatric Surgery [facility] [phone]

## 2025-03-08 NOTE — HISTORY OF PRESENT ILLNESS
[Parents] : parents [Frequency of stools: ___] : Frequency of stools: [unfilled]  stools [Green/brown] : green/brown [Yellow] : yellow [Loose] : loose consistency [Formula ___ oz/feed] : [unfilled] oz of formula per feed [Hours between feeds ___] : Child is fed every [unfilled] hours [In Bassinet/Crib] : sleeps in bassinet/crib [On back] : sleeps on back [Pacifier use] : Pacifier use [No] : No cigarette smoke exposure [Water heater temperature set at <120 degrees F] : Water heater temperature set at <120 degrees F [Rear facing car seat in back seat] : Rear facing car seat in back seat [Carbon Monoxide Detectors] : Carbon monoxide detectors at home [Smoke Detectors] : Smoke detectors at home. [NO] : No [Co-sleeping] : no co-sleeping [Loose bedding, pillow, toys, and/or bumpers in crib] : no loose bedding, pillow, toys, and/or bumpers in crib [Exposure to electronic nicotine delivery system] : No exposure to electronic nicotine delivery system [At risk for exposure to TB] : Not at risk for exposure to Tuberculosis  [FreeTextEntry7] : 1) She saw Pulmonary for history of tachypnea thought to it could be periodic breathing ;2) ALEKSANDR she saw ENT and had a scope for noisy breathing and it was normal. No apneas or color change. 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

## 2025-03-08 NOTE — PHYSICAL EXAM
[Alert] : alert [Normocephalic] : normocephalic [Flat Open Anterior [place]] : flat open anterior fontanelle [PERRL] : PERRL [Red Reflex Bilateral] : red reflex bilateral [Normally Placed Ears] : normally placed ears [Auricles Well Formed] : auricles well formed [Clear Tympanic membranes] : clear tympanic membranes [Light reflex present] : light reflex present [Bony landmarks visible] : bony landmarks visible [Nares Patent] : nares patent [Palate Intact] : palate intact [Uvula Midline] : uvula midline [Supple, full passive range of motion] : supple, full passive range of motion [Symmetric Chest Rise] : symmetric chest rise [Clear to Auscultation Bilaterally] : clear to auscultation bilaterally [Regular Rate and Rhythm] : regular rate and rhythm [S1, S2 present] : S1, S2 present [+2 Femoral Pulses] : +2 femoral pulses [Soft] : soft [Bowel Sounds] : bowel sounds present [Normal external genitailia] : normal external genitalia [Patent Vagina] : vagina patent [Normally Placed] : normally placed [No Abnormal Lymph Nodes Palpated] : no abnormal lymph nodes palpated [Symmetric Flexed Extremities] : symmetric flexed extremities [Startle Reflex] : startle reflex present [Suck Reflex] : suck reflex present [Rooting] : rooting reflex present [Palmar Grasp] : palmar grasp reflex present [Plantar Grasp] : plantar grasp reflex present [Symmetric Ranjit] : symmetric [place] [Acute Distress] : no acute distress [Discharge] : no discharge [Palpable Masses] : no palpable masses [Murmurs] : no murmurs [Tender] : nontender [Distended] : not distended [Hepatomegaly] : no hepatomegaly [Splenomegaly] : no splenomegaly [Clitoromegaly] : no clitoromegaly [Lebron-Ortolani] : negative Lebron-Ortolani [Spinal Dimple] : no spinal dimple [Tuft of Hair] : no tuft of hair [Rash and/or lesion present] : no rash/lesion [FreeTextEntry9] : large UMBILICAL HERNIA NOTED- easily reducible [FreeTextEntry6] : Left inguinal hernia- reducible with no concerns

## 2025-03-08 NOTE — HISTORY OF PRESENT ILLNESS
[Parents] : parents [Frequency of stools: ___] : Frequency of stools: [unfilled]  stools [Green/brown] : green/brown [Yellow] : yellow [Loose] : loose consistency [Formula ___ oz/feed] : [unfilled] oz of formula per feed [Hours between feeds ___] : Child is fed every [unfilled] hours [In Bassinet/Crib] : sleeps in bassinet/crib [On back] : sleeps on back [Pacifier use] : Pacifier use [No] : No cigarette smoke exposure [Water heater temperature set at <120 degrees F] : Water heater temperature set at <120 degrees F [Rear facing car seat in back seat] : Rear facing car seat in back seat [Carbon Monoxide Detectors] : Carbon monoxide detectors at home [Smoke Detectors] : Smoke detectors at home. [NO] : No [Co-sleeping] : no co-sleeping [Loose bedding, pillow, toys, and/or bumpers in crib] : no loose bedding, pillow, toys, and/or bumpers in crib [Exposure to electronic nicotine delivery system] : No exposure to electronic nicotine delivery system [At risk for exposure to TB] : Not at risk for exposure to Tuberculosis  [FreeTextEntry7] : 1) She saw Pulmonary for history of tachypnea thought to it could be periodic breathing ;2) ALEKSANDR she saw ENT and had a scope for noisy breathing and it was normal. No apneas or color change. 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

## 2025-03-08 NOTE — PHYSICAL EXAM
[Alert] : alert [Normocephalic] : normocephalic [Flat Open Anterior [place]] : flat open anterior fontanelle [PERRL] : PERRL [Red Reflex Bilateral] : red reflex bilateral [Normally Placed Ears] : normally placed ears [Auricles Well Formed] : auricles well formed [Clear Tympanic membranes] : clear tympanic membranes [Light reflex present] : light reflex present [Bony landmarks visible] : bony landmarks visible [Nares Patent] : nares patent [Palate Intact] : palate intact [Uvula Midline] : uvula midline [Supple, full passive range of motion] : supple, full passive range of motion [Symmetric Chest Rise] : symmetric chest rise [Clear to Auscultation Bilaterally] : clear to auscultation bilaterally [Regular Rate and Rhythm] : regular rate and rhythm [S1, S2 present] : S1, S2 present [+2 Femoral Pulses] : +2 femoral pulses [Soft] : soft [Bowel Sounds] : bowel sounds present [Normal external genitailia] : normal external genitalia [Patent Vagina] : vagina patent [Normally Placed] : normally placed [No Abnormal Lymph Nodes Palpated] : no abnormal lymph nodes palpated [Symmetric Flexed Extremities] : symmetric flexed extremities [Startle Reflex] : startle reflex present [Suck Reflex] : suck reflex present [Rooting] : rooting reflex present [Palmar Grasp] : palmar grasp reflex present [Plantar Grasp] : plantar grasp reflex present [Symmetric Ranjit] : symmetric [place] [Acute Distress] : no acute distress [Discharge] : no discharge [Palpable Masses] : no palpable masses [Murmurs] : no murmurs [Tender] : nontender [Distended] : not distended [Hepatomegaly] : no hepatomegaly [Splenomegaly] : no splenomegaly [Clitoromegaly] : no clitoromegaly [Lebron-Ortolani] : negative Lebron-Ortolani [Spinal Dimple] : no spinal dimple [Tuft of Hair] : no tuft of hair [Rash and/or lesion present] : no rash/lesion [FreeTextEntry9] : large UMBILICAL HERNIA NOTED- easily reducible [FreeTextEntry6] : Left inguinal hernia- reducible with no concerns

## 2025-03-08 NOTE — DISCUSSION/SUMMARY
[Normal Growth] : growth [Normal Development] : development  [No Elimination Concerns] : elimination [Continue Regimen] : feeding [No Skin Concerns] : skin [Normal Sleep Pattern] : sleep [None] : no medical problems [Anticipatory Guidance Given] : Anticipatory guidance addressed as per the history of present illness section [Age Approp Vaccines] : Age appropriate vaccines administered [No Medications] : ~He/She~ is not on any medications [Parent/Guardian] : Parent/Guardian [] : The components of the vaccine(s) to be administered today are listed in the plan of care. The disease(s) for which the vaccine(s) are intended to prevent and the risks have been discussed with the caretaker.  The risks are also included in the appropriate vaccination information statements which have been provided to the patient's caregiver.  The caregiver has given consent to vaccinate. [de-identified] : left inguinal hernia- reducible [de-identified] : PSYCHIATRY for post partum depression in MOM (Mom already has a counselor and she will follow up with them- no concerns today for any thoughts of self harm or harm to baby). [de-identified] : PENTCEL given left thigh and ROTAVIRUS given oral

## 2025-03-08 NOTE — HISTORY OF PRESENT ILLNESS
[Parents] : parents [Frequency of stools: ___] : Frequency of stools: [unfilled]  stools [Green/brown] : green/brown [Yellow] : yellow [Loose] : loose consistency [Formula ___ oz/feed] : [unfilled] oz of formula per feed [Hours between feeds ___] : Child is fed every [unfilled] hours [In Bassinet/Crib] : sleeps in bassinet/crib [On back] : sleeps on back [Pacifier use] : Pacifier use [No] : No cigarette smoke exposure [Water heater temperature set at <120 degrees F] : Water heater temperature set at <120 degrees F [Rear facing car seat in back seat] : Rear facing car seat in back seat [Carbon Monoxide Detectors] : Carbon monoxide detectors at home [Smoke Detectors] : Smoke detectors at home. [NO] : No [Co-sleeping] : no co-sleeping [Loose bedding, pillow, toys, and/or bumpers in crib] : no loose bedding, pillow, toys, and/or bumpers in crib [Exposure to electronic nicotine delivery system] : No exposure to electronic nicotine delivery system [At risk for exposure to TB] : Not at risk for exposure to Tuberculosis  [FreeTextEntry7] : 1) She saw Pulmonary for history of tachypnea thought to it could be periodic breathing ;2) ALEKSANDR she saw ENT and had a scope for noisy breathing and it was normal. No apneas or color change. 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

## 2025-03-08 NOTE — DISCUSSION/SUMMARY
[Normal Growth] : growth [Normal Development] : development  [No Elimination Concerns] : elimination [Continue Regimen] : feeding [No Skin Concerns] : skin [Normal Sleep Pattern] : sleep [None] : no medical problems [Anticipatory Guidance Given] : Anticipatory guidance addressed as per the history of present illness section [Age Approp Vaccines] : Age appropriate vaccines administered [No Medications] : ~He/She~ is not on any medications [Parent/Guardian] : Parent/Guardian [] : The components of the vaccine(s) to be administered today are listed in the plan of care. The disease(s) for which the vaccine(s) are intended to prevent and the risks have been discussed with the caretaker.  The risks are also included in the appropriate vaccination information statements which have been provided to the patient's caregiver.  The caregiver has given consent to vaccinate. [de-identified] : left inguinal hernia- reducible [de-identified] : PSYCHIATRY for post partum depression in MOM (Mom already has a counselor and she will follow up with them- no concerns today for any thoughts of self harm or harm to baby). [de-identified] : PENTCEL given left thigh and ROTAVIRUS given oral

## 2025-03-08 NOTE — DISCUSSION/SUMMARY
[Normal Growth] : growth [Normal Development] : development  [No Elimination Concerns] : elimination [Continue Regimen] : feeding [No Skin Concerns] : skin [Normal Sleep Pattern] : sleep [None] : no medical problems [Anticipatory Guidance Given] : Anticipatory guidance addressed as per the history of present illness section [Age Approp Vaccines] : Age appropriate vaccines administered [No Medications] : ~He/She~ is not on any medications [Parent/Guardian] : Parent/Guardian [] : The components of the vaccine(s) to be administered today are listed in the plan of care. The disease(s) for which the vaccine(s) are intended to prevent and the risks have been discussed with the caretaker.  The risks are also included in the appropriate vaccination information statements which have been provided to the patient's caregiver.  The caregiver has given consent to vaccinate. [de-identified] : left inguinal hernia- reducible [de-identified] : PSYCHIATRY for post partum depression in MOM (Mom already has a counselor and she will follow up with them- no concerns today for any thoughts of self harm or harm to baby). [de-identified] : PENTCEL given left thigh and ROTAVIRUS given oral

## 2025-03-08 NOTE — PHYSICAL EXAM
[Alert] : alert [Normocephalic] : normocephalic [Flat Open Anterior [place]] : flat open anterior fontanelle [PERRL] : PERRL [Red Reflex Bilateral] : red reflex bilateral [Normally Placed Ears] : normally placed ears [Auricles Well Formed] : auricles well formed [Clear Tympanic membranes] : clear tympanic membranes [Light reflex present] : light reflex present [Bony landmarks visible] : bony landmarks visible [Nares Patent] : nares patent [Palate Intact] : palate intact [Uvula Midline] : uvula midline [Supple, full passive range of motion] : supple, full passive range of motion [Symmetric Chest Rise] : symmetric chest rise [Clear to Auscultation Bilaterally] : clear to auscultation bilaterally [Regular Rate and Rhythm] : regular rate and rhythm [S1, S2 present] : S1, S2 present [+2 Femoral Pulses] : +2 femoral pulses [Soft] : soft [Bowel Sounds] : bowel sounds present [Normal external genitailia] : normal external genitalia [Patent Vagina] : vagina patent [Normally Placed] : normally placed [No Abnormal Lymph Nodes Palpated] : no abnormal lymph nodes palpated [Symmetric Flexed Extremities] : symmetric flexed extremities [Startle Reflex] : startle reflex present [Suck Reflex] : suck reflex present [Rooting] : rooting reflex present [Palmar Grasp] : palmar grasp reflex present [Plantar Grasp] : plantar grasp reflex present [Symmetric Ranjit] : symmetric [place] [Acute Distress] : no acute distress [Discharge] : no discharge [Palpable Masses] : no palpable masses [Murmurs] : no murmurs [Tender] : nontender [Distended] : not distended [Hepatomegaly] : no hepatomegaly [Splenomegaly] : no splenomegaly [Clitoromegaly] : no clitoromegaly [Lebron-Ortolani] : negative Lebron-Ortolani [Spinal Dimple] : no spinal dimple [Tuft of Hair] : no tuft of hair [Rash and/or lesion present] : no rash/lesion [FreeTextEntry9] : large UMBILICAL HERNIA NOTED- easily reducible [FreeTextEntry6] : Left inguinal hernia- reducible with no concerns

## 2025-03-08 NOTE — DEVELOPMENTAL MILESTONES
[Normal Development] : Normal Development [None] : none [Smiles responsively] : smiles responsively [Vocalizes with simple cooing] : vocalizes with simple cooing [Lifts head and chest in prone] : lifts head and chest in prone [Opens and shuts hands] : opens and shuts hands [Not Passed] : not passed [FreeTextEntry1] : I discussed pospartum depression with parents- Mom states she has a counselor and will be seeing PSYCH. She is currently feeling well and has not had any thought of self harm or harm to baby. Dad says she has anxiety.

## 2025-03-18 NOTE — PHYSICAL EXAM
[Well Developed] : well developed [Well Nourished] : well nourished [NAD] : in no acute distress [Alert and Active] : alert and active [PERRL] : pupils were equal, round, reactive to light  [icteric] : anicteric [Moist & Pink Mucous Membranes] : moist and pink mucous membranes [CTAB] : lungs clear to auscultation bilaterally [Respiratory Distress] : no respiratory distress  [Wheeze] : no wheezing  [Regular Rate and Rhythm] : regular rate and rhythm [Normal S1, S2] : normal S1 and S2 [Soft] : soft  [Distended] : non distended [Tender] : non tender [Normal Bowel Sounds] : normal bowel sounds [Stool Palpable] : no stool palpable [Mass ___ cm] : no masses were palpated [No HSM] : no hepatosplenomegaly appreciated [Normal Tone] : normal tone [Well-Perfused] : well-perfused [Edema] : no edema [Cyanosis] : no cyanosis [Rash] : no rash [Jaundice] : no jaundice [Interactive] : interactive [Appropriate Behavior] : appropriate behavior [de-identified] : Soft umbilical hernia, reducible

## 2025-03-18 NOTE — REVIEW OF SYSTEMS
[Negative] : Skin [FreeTextEntry6] : Tachypnea [FreeTextEntry5] : See cards note [FreeTextEntry1] : Inguinal hernia

## 2025-03-18 NOTE — CONSULT LETTER
[Dear  ___] : Dear  [unfilled], [Consult Letter:] : I had the pleasure of evaluating your patient, [unfilled]. [Please see my note below.] : Please see my note below. [Consult Closing:] : Thank you very much for allowing me to participate in the care of this patient.  If you have any questions, please do not hesitate to contact me. [Sincerely,] : Sincerely, [FreeTextEntry3] : Erika Boggs MD Attending Physician Pediatric Gastroenterology and Nutrition

## 2025-03-18 NOTE — PHYSICAL EXAM
[Well Developed] : well developed [Well Nourished] : well nourished [NAD] : in no acute distress [Alert and Active] : alert and active [PERRL] : pupils were equal, round, reactive to light  [icteric] : anicteric [Moist & Pink Mucous Membranes] : moist and pink mucous membranes [CTAB] : lungs clear to auscultation bilaterally [Respiratory Distress] : no respiratory distress  [Wheeze] : no wheezing  [Regular Rate and Rhythm] : regular rate and rhythm [Normal S1, S2] : normal S1 and S2 [Soft] : soft  [Distended] : non distended [Tender] : non tender [Normal Bowel Sounds] : normal bowel sounds [Stool Palpable] : no stool palpable [Mass ___ cm] : no masses were palpated [No HSM] : no hepatosplenomegaly appreciated [Normal Tone] : normal tone [Well-Perfused] : well-perfused [Edema] : no edema [Cyanosis] : no cyanosis [Rash] : no rash [Jaundice] : no jaundice [Interactive] : interactive [Appropriate Behavior] : appropriate behavior [de-identified] : Soft umbilical hernia, reducible

## 2025-03-18 NOTE — ASSESSMENT
[FreeTextEntry1] : 2-month-old female here for evaluation of reflux.  She is an overall very good eater though family notes at the end of some of the feeds she has arching and pulls off the bottle and is fussy.  She has excellent weight gain which is reassuring and is able to eat the majority of her feeds without difficulty.  Discussed with the family that she has signs of mild reflux though overall is doing well and would not likely benefit from thickening feeds or medication.  When she naturally pulls off the bottle they can try comforting her, keeping her upright and offering a pacifier as tolerated but if she continues to be hungry they can offer her more formula.  Discussed the natural course of reflux with the family and that this will likely improve around 6 to 7 months when she starts solids and she is able to sit up.  Discussed with the family that if she has clinical worsening and starts having difficulty feeding earlier in the feeds or during the feeds and has ongoing difficulty eating to call and we will arrange a follow-up visit

## 2025-03-18 NOTE — HISTORY OF PRESENT ILLNESS
[de-identified] : This is a 2-month-old female here for evaluation of reflux referred by their pediatrician Dr. OBDULIA KAHN.  BW was 6.14 lbs. No issues with the pregnancy or delivery. Elective CS.  FT. IVF pregnancy  Mom recalls the baby passed meconium on the first day of life. Was on enfamil initially and she was spiting up often and had some reflux. Changed to Kendamil over 2 weeks ago and maybe a little more agitated, but not spitting up as much, smaller volume. Smells sour.  She is a good eater and eats well. At the end of the bottle, throws her head back and slides.     Spit up is intermittent, can be a few times per day. Takes 4-7oz. on avg about 5 oz per feed.   She gets a little fussy after eating.  Towards the end of the feed she pulled back from the bottle and gets stiff, arches.  She seems to be uncomfortable and then seems to want to eat again but has difficulty.  She is not burping as much, she takes gas relief drops and probiotics.  Sleeping better, Goes to bed 10:30 and wakes up 2-3x per night.  Now she is not eating as much at night.  Nights have been better.  She has had excellent weight gain.  She stools 1-2x/day, There is no blood or mucous in the stool. Good urination  -I reviewed the notes from the pediatrician, pulmonary and cardiology and surgery

## 2025-03-18 NOTE — HISTORY OF PRESENT ILLNESS
[de-identified] : This is a 2-month-old female here for evaluation of reflux referred by their pediatrician Dr. OBDULIA KAHN.  BW was 6.14 lbs. No issues with the pregnancy or delivery. Elective CS.  FT. IVF pregnancy  Mom recalls the baby passed meconium on the first day of life. Was on enfamil initially and she was spiting up often and had some reflux. Changed to Kendamil over 2 weeks ago and maybe a little more agitated, but not spitting up as much, smaller volume. Smells sour.  She is a good eater and eats well. At the end of the bottle, throws her head back and slides.     Spit up is intermittent, can be a few times per day. Takes 4-7oz. on avg about 5 oz per feed.   She gets a little fussy after eating.  Towards the end of the feed she pulled back from the bottle and gets stiff, arches.  She seems to be uncomfortable and then seems to want to eat again but has difficulty.  She is not burping as much, she takes gas relief drops and probiotics.  Sleeping better, Goes to bed 10:30 and wakes up 2-3x per night.  Now she is not eating as much at night.  Nights have been better.  She has had excellent weight gain.  She stools 1-2x/day, There is no blood or mucous in the stool. Good urination  -I reviewed the notes from the pediatrician, pulmonary and cardiology and surgery

## 2025-04-09 NOTE — PHYSICAL EXAM
[Alert] : alert [Normocephalic] : normocephalic [Flat Open Anterior [place]] : flat open anterior fontanelle [PERRL] : PERRL [Red Reflex Bilateral] : red reflex bilateral [Normally Placed Ears] : normally placed ears [Auricles Well Formed] : auricles well formed [Clear Tympanic membranes] : clear tympanic membranes [Light reflex present] : light reflex present [Bony landmarks visible] : bony landmarks visible [Nares Patent] : nares patent [Palate Intact] : palate intact [Uvula Midline] : uvula midline [Supple, full passive range of motion] : supple, full passive range of motion [Symmetric Chest Rise] : symmetric chest rise [Clear to Auscultation Bilaterally] : clear to auscultation bilaterally [Regular Rate and Rhythm] : regular rate and rhythm [S1, S2 present] : S1, S2 present [+2 Femoral Pulses] : +2 femoral pulses [Soft] : soft [Bowel Sounds] : bowel sounds present [Normal external genitalia] : normal external genitalia [Normal Vaginal Introitus] : normal vaginal introitus [Normally Placed] : normally placed [No Abnormal Lymph Nodes Palpated] : no abnormal lymph nodes palpated [Symmetric Flexed Extremities] : symmetric flexed extremities [Startle Reflex] : startle reflex present [Suck Reflex] : suck reflex present [Rooting] : rooting reflex present [Palmar Grasp] : palmar grasp reflex present [Plantar Grasp] : plantar grasp reflex present [Symmetric Ranjit] : symmetric [place] [Acute Distress] : no acute distress [Discharge] : no discharge [Palpable Masses] : no palpable masses [Murmurs] : no murmurs [Tender] : nontender [Distended] : not distended [Hepatomegaly] : no hepatomegaly [Splenomegaly] : no splenomegaly [Clitoromegaly] : no clitoromegaly [Lebron-Ortolani] : negative Lebron-Ortolani [Spinal Dimple] : no spinal dimple [Tuft of Hair] : no tuft of hair [Rash and/or lesion present] : no rash/lesion [FreeTextEntry1] : happy, cooing, laughing  [FreeTextEntry7] : no tachypnea noted, no retractions  [FreeTextEntry9] : scab at unbilicus- helaing well not infected

## 2025-04-09 NOTE — HISTORY OF PRESENT ILLNESS
[Mother] : mother [___ Feeding per 24 hrs] : a  total of [unfilled] feedings in 24 hours [Co-sleeping] : no co-sleeping [At risk for exposure to TB] : Not at risk for exposure to Tuberculosis  [de-identified] : 1-BABY will still breath fast with drinking and periodice breathing noted  2- mom cannot figure out if hands to mouth hungry sign [de-identified] : DAIN  [de-identified] : hands in mouth [FreeTextEntry1] : TACHYPNEA noted by 2 weeks of age seen by ENT and then PULM (dr Husain 2/10/25) who was able to observe 2 seperate bottle feedings- DX Periodic breathing of -  Baby has been followed closely by PULM since - STABLE SURGERY  Dr Gomez for Left inguinal and umbilical hernia- both repaired on 3/27/25 CARDIO  Dr Gabriel  25- +murmur at baby visit-  murmur from PPS which resolves by 9mo next sarita at 1 year old- no concerns - 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

## 2025-04-09 NOTE — DISCUSSION/SUMMARY
[FreeTextEntry1] : 3mo FT baby- doing well - see above for specialist visits NO VACCINES at today appt- UTD - all given at last visit Addressed mom concern- Tachypnea/ periodic breathing -  GI, cardiac and pulm exam all normal - explain that periodic breathing is normal breathing pattern, tachypnea with eating is from mild GERD noted by GI  2- Fingers in mouth is NORMAL developmental stage, if baby not crying and happy with hands in mouth then do NOT need to feed her / Surgery appt post surgery visit - hernia repairs If formula is needed, recommend iron-fortified formulations. When in car, patient should be in rear-facing car seat in back seat. Put baby to sleep on back, in own crib with no loose or soft bedding. Help baby to maintain sleep and feeding routines. May offer pacifier if needed. Continue tummy time when awake. Parents counseled to call if rectal temperature >100.4 degrees F. return at 4months old for well visit

## 2025-04-09 NOTE — DEVELOPMENTAL MILESTONES
[Smiles responsively] : smiles responsively [Vocalizes with simple cooing] : vocalizes with simple cooing [Lifts head and chest in prone] : lifts head and chest in prone [Opens and shuts hands] : opens and shuts hands [Laughs aloud] : laughs aloud [Turns to voice] : turns to voice [Plays with fingers in midline] : plays with fingers in midline [Rolls over prone to supine] : does not roll over prone to supine

## 2025-04-09 NOTE — PHYSICAL EXAM
[Alert] : alert [Normocephalic] : normocephalic [Flat Open Anterior [place]] : flat open anterior fontanelle [PERRL] : PERRL [Red Reflex Bilateral] : red reflex bilateral [Normally Placed Ears] : normally placed ears [Auricles Well Formed] : auricles well formed [Clear Tympanic membranes] : clear tympanic membranes [Light reflex present] : light reflex present [Bony landmarks visible] : bony landmarks visible [Nares Patent] : nares patent [Palate Intact] : palate intact [Uvula Midline] : uvula midline [Supple, full passive range of motion] : supple, full passive range of motion [Symmetric Chest Rise] : symmetric chest rise [Clear to Auscultation Bilaterally] : clear to auscultation bilaterally [Regular Rate and Rhythm] : regular rate and rhythm [S1, S2 present] : S1, S2 present [+2 Femoral Pulses] : +2 femoral pulses [Soft] : soft [Bowel Sounds] : bowel sounds present [Normal external genitalia] : normal external genitalia [Normal Vaginal Introitus] : normal vaginal introitus [Normally Placed] : normally placed [No Abnormal Lymph Nodes Palpated] : no abnormal lymph nodes palpated [Symmetric Flexed Extremities] : symmetric flexed extremities [Startle Reflex] : startle reflex present [Suck Reflex] : suck reflex present [Rooting] : rooting reflex present [Palmar Grasp] : palmar grasp reflex present [Plantar Grasp] : plantar grasp reflex present [Symmetric Ranjit] : symmetric [place] [Acute Distress] : no acute distress [Discharge] : no discharge [Palpable Masses] : no palpable masses [Murmurs] : no murmurs [Tender] : nontender [Distended] : not distended [Hepatomegaly] : no hepatomegaly [Splenomegaly] : no splenomegaly [Clitoromegaly] : no clitoromegaly [Lebron-Ortolani] : negative Lebron-Ortolani [Spinal Dimple] : no spinal dimple [Tuft of Hair] : no tuft of hair [Rash and/or lesion present] : no rash/lesion [FreeTextEntry1] : happy, cooing, laughing  [FreeTextEntry7] : no tachypnea noted, no retractions  [FreeTextEntry9] : scab at unbilicus- helaing well not infected

## 2025-04-09 NOTE — HISTORY OF PRESENT ILLNESS
[Mother] : mother [___ Feeding per 24 hrs] : a  total of [unfilled] feedings in 24 hours [Co-sleeping] : no co-sleeping [At risk for exposure to TB] : Not at risk for exposure to Tuberculosis  [de-identified] : 1-BABY will still breath fast with drinking and periodice breathing noted  2- mom cannot figure out if hands to mouth hungry sign [de-identified] : DAIN  [de-identified] : hands in mouth [FreeTextEntry1] : TACHYPNEA noted by 2 weeks of age seen by ENT and then PULM (dr Husain 2/10/25) who was able to observe 2 seperate bottle feedings- DX Periodic breathing of -  Baby has been followed closely by PULM since - STABLE SURGERY  Dr Gomez for Left inguinal and umbilical hernia- both repaired on 3/27/25 CARDIO  Dr Gabriel  25- +murmur at baby visit-  murmur from PPS which resolves by 9mo next sarita at 1 year old- no concerns - 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

## 2025-04-10 NOTE — PHYSICAL EXAM
[Clean] : not clean [Dry] : dry [Intact] : intact [Erythema] : no erythema [Granulation tissue] : no granulation tissue [Drainage] : no drainage [NL] : soft, not tender, not distended

## 2025-04-10 NOTE — ASSESSMENT
[FreeTextEntry1] : PHIL is a 3 month girl s/p recent laparoscopic inguinal hernia repair and umbilical hernia repair. She is doing well, no complaints of pain, tolerating a diet, and has no evidence of hernia on exam. All of Her wounds have healed well. I discussed the lifelong small risk of recurrence. She is cleared to return to all of Her normal activities without restrictions. While I do not need to see PHIL for routine follow-up, I would be happy to see her anytime should any questions or problems arise. All questions were answered.

## 2025-04-10 NOTE — REASON FOR VISIT
[____ Week(s)] : [unfilled] week(s)  [Laparoscopic inguinal hernia repair] : laparoscopic inguinal hernia repair [Umbilical hernia repair] : umbilical hernia repair [Patient] : patient [Mother] : mother [Medical Records] : medical records [Pain] : ~He/She~ does not have pain [Fever] : ~He/She~ does not have fever [Normal bowel movements] : ~He/She~ has normal bowel movements [Vomiting] : ~He/She~ does not have vomiting [Tolerating Diet] : ~He/She~ is tolerating diet [Redness at incision] : ~He/She~ does not have redness at incision [Drainage at incision] : ~He/She~ does not have drainage at incision [Swelling at surgical site] : ~He/She~ does not have swelling at surgical site [de-identified] : 3-27-25 [de-identified] : Dr Gomez [de-identified] : Now 3 weeks post op from BIH and umbilical hernia repair.  She presents for a post op visit

## 2025-04-10 NOTE — CONSULT LETTER
[Dear  ___] : Dear  [unfilled], [Courtesy Letter:] : I had the pleasure of seeing your patient, [unfilled], in my office today. [Please see my note below.] : Please see my note below. [Consult Closing:] : Thank you very much for allowing me to participate in the care of this patient.  If you have any questions, please do not hesitate to contact me. [Sincerely,] : Sincerely, [FreeTextEntry2] : Neisha Younger MD [FreeTextEntry3] : Blanca Dhillon  MSN  CPNP Pediatric Nurse Practitioner Department of Pediatric Surgery [facility] phone [phone] fax [phone]

## 2025-05-05 NOTE — HISTORY OF PRESENT ILLNESS
[Mother] : mother [Well-balanced] : well-balanced [Normal] : Normal [In Bassinet/Crib] : sleeps in bassinet/crib [On back] : sleeps on back [Tummy time] : tummy time [No] : No cigarette smoke exposure [Water heater temperature set at <120 degrees F] : Water heater temperature set at <120 degrees F [Rear facing car seat in back seat] : Rear facing car seat in back seat [Carbon Monoxide Detectors] : Carbon monoxide detectors at home [Smoke Detectors] : Smoke detectors at home. [Formula ___ oz/feed] : [unfilled] oz of formula per feed [___ voids per day] : [unfilled] voids per day [Frequency of stools: ___] : Frequency of stools: [unfilled]  stools [every other day] : every other day. [Loose bedding, pillow, toys, and/or bumpers in crib] : no loose bedding, pillow, toys, and/or bumpers in crib [de-identified] : DAIN formula  [FreeTextEntry3] : wakes 2x for feeds-  sleep 4-5 hours [FreeTextEntry1] : extensive past medical history see 3 month well visit narrative -  all history is stable if not resolved  MOTHER notice GERD symptoms worsen- mom reached out to GI for new appt

## 2025-05-05 NOTE — PHYSICAL EXAM
[Alert] : alert [Playful] : playful [Normocephalic] : normocephalic [Flat Open Anterior [place]] : flat open anterior fontanelle [Red Reflex] : red reflex bilateral [PERRL] : PERRL [Normally Placed Ears] : normally placed ears [Auricles Well Formed] : auricles well formed [Clear Tympanic membranes] : clear tympanic membranes [Light reflex present] : light reflex present [Bony landmarks visible] : bony landmarks visible [Nares Patent] : nares patent [Palate Intact] : palate intact [Uvula Midline] : uvula midline [Symmetric Chest Rise] : symmetric chest rise [Clear to Auscultation Bilaterally] : clear to auscultation bilaterally [Regular Rate and Rhythm] : regular rate and rhythm [S1, S2 present] : S1, S2 present [+2 Femoral Pulses] : (+) 2 femoral pulses [Soft] : soft [Bowel Sounds] : bowel sounds present [Normal External Genitalia] : normal external genitalia [Normal Vaginal Introitus] : normal vaginal introitus [Patent] : patent [Normally Placed] : normally placed [No Abnormal Lymph Nodes Palpated] : no abnormal lymph nodes palpated [Startle Reflex] : startle reflex present [Plantar Grasp] : plantar grasp reflex present [Symmetric Ranjit] : symmetric ranjit [Nevus Flammeus] : nevus flammeus [Acute Distress] : no acute distress [Discharge] : no discharge [Palpable Masses] : no palpable masses [Murmurs] : no murmurs [Tender] : nontender [Distended] : nondistended [Hepatomegaly] : no hepatomegaly [Splenomegaly] : no splenomegaly [Clitoromegaly] : no clitoromegaly [Lebron-Ortolani] : negative Lebron-Ortolani [Allis Sign] : negative Allis sign [Spinal Dimple] : no spinal dimple [Tuft of Hair] : no tuft of hair [Rash or Lesions] : no rash/lesions [FreeTextEntry1] : laughing at me  good eye contact  [FreeTextEntry2] : good head ocntrol  [FreeTextEntry9] : scars from hernia repair on belly not infected

## 2025-05-05 NOTE — DISCUSSION/SUMMARY
[Normal Growth] : growth [Normal Development] : development  [No Elimination Concerns] : elimination [Continue Regimen] : feeding [No Skin Concerns] : skin [Normal Sleep Pattern] : sleep [None] : no medical problems [Anticipatory Guidance Given] : Anticipatory guidance addressed as per the history of present illness section [Age Approp Vaccines] : Age appropriate vaccines administered [No Medications] : ~He/She~ is not on any medications [Parent/Guardian] : Parent/Guardian [] : The components of the vaccine(s) to be administered today are listed in the plan of care. The disease(s) for which the vaccine(s) are intended to prevent and the risks have been discussed with the caretaker.  The risks are also included in the appropriate vaccination information statements which have been provided to the patient's caregiver.  The caregiver has given consent to vaccinate. [FreeTextEntry1] : The components of today's vaccine(s) include  PENTACEL AND ROTAVIRUS   PREVNAR - over 8weekf from previous doses .  REVIEW BABY'S GROWTH AND DEVELOPMENT- NORMAL ANSWER PARENTS QUESTIONS AND ADDRESS THEIR CONCERNS- solid food feed discuss can start mom nervous about choking and baby head control want sto wait until 5 month ols-advice givenabout choking watch baby while feeding do NO T leave unattended- mom has upcoming CPR class mom waiting on GI call back - GERD- at this time recommend elevate matress when sleep   RETURN for next scheduled WELL VISIT

## 2025-05-14 NOTE — HISTORY OF PRESENT ILLNESS
[Derm Symptoms] : DERM SYMPTOMS [Rash] : rash [Diaper area] : diaper area [___ Day(s)] : [unfilled] day(s) [Constant] : constant [New Clothing] : no new clothing [Recent Antibiotic Use] : no recent antibiotic use [Fever] : no fever [URI Symptoms] : no URI symptoms [Vomiting] : no vomiting [de-identified] : not spreading   [de-identified] : aquophor- to diaper also lulu chest

## 2025-05-14 NOTE — DISCUSSION/SUMMARY
[FreeTextEntry1] : diaper rash  NOT FUNGAL-  can be from sweat  use zinc based emmollient to diaper area- bath daily air dry rash on chest- sweat drool- reassurance  use vaseline

## 2025-05-14 NOTE — REASON FOR VISIT
[Parents] : parents [Medical Records] : medical records [Consultation Follow Up] : a consultation follow up

## 2025-05-14 NOTE — PHYSICAL EXAM
[Normal External Genitalia] : normal external genitalia [NL] : moves all extremities x4, warm, well perfused x4, capillary refill < 2s [de-identified] : drooling hands together in mouth  [FreeTextEntry6] : erythema splotches on thighs and perineum has red papular rash ANUS clear

## 2025-05-19 NOTE — ASSESSMENT
[FreeTextEntry1] : 4-month-old female here for follow-up of reflux.  She is an overall very good eater though family notes at the end of some of the feeds she has arching and pulls off the bottle and is fussy.  She has excellent weight gain which is reassuring and is able to eat the majority of her feeds without difficulty.  We discussed that there are side effects of medications that they typically do not resolve reflux and we could try either changing formula to something more hypoallergenic or thickening the feeds to decrease reflux while we are waiting for her to grow out of it.  Recommend; - Can try thickening formula with oatmeal cereal, 1 teaspoon per 2 ounces and increase to a max of 1 tablespoon per 2 ounces - Reflux precautions discussed - Had a long discussion about complementary feeding and introduction of solids - Family instructed to call for lab results or if any questions or concerns. Family was asked to make a follow-up visit to be seen if not improving over the next few months yes...

## 2025-05-19 NOTE — PHYSICAL EXAM
[Well Developed] : well developed [Well Nourished] : well nourished [NAD] : in no acute distress [Alert and Active] : alert and active [PERRL] : pupils were equal, round, reactive to light  [Moist & Pink Mucous Membranes] : moist and pink mucous membranes [CTAB] : lungs clear to auscultation bilaterally [Regular Rate and Rhythm] : regular rate and rhythm [Normal S1, S2] : normal S1 and S2 [Soft] : soft  [Normal Bowel Sounds] : normal bowel sounds [No HSM] : no hepatosplenomegaly appreciated [Normal Tone] : normal tone [Well-Perfused] : well-perfused [Interactive] : interactive [Appropriate Behavior] : appropriate behavior [de-identified] : Soft umbilical hernia, reducible [icteric] : anicteric [Respiratory Distress] : no respiratory distress  [Wheeze] : no wheezing  [Distended] : non distended [Tender] : non tender [Stool Palpable] : no stool palpable [Mass ___ cm] : no masses were palpated [Edema] : no edema [Cyanosis] : no cyanosis [Rash] : no rash [Jaundice] : no jaundice

## 2025-05-19 NOTE — HISTORY OF PRESENT ILLNESS
[de-identified] : This is a 4-month-old female here for follow-up of reflux.  BW was 6.14 lbs. No issues with the pregnancy or delivery. Elective CS.  FT. IVF pregnancy  Mom recalls the baby passed meconium on the first day of life. Was on enfamil initially and she was spiting up often and had some reflux. Changed to Kendamil and maybe a little more agitated, but not spitting up as much, smaller volume.   She is here for a follow-up visit.  There were a few days she spit up with every meal.  She is still spitting up.  Seems uncomfortable with eating.  She is on kendamil formula and is eating well. She is random with her eating, sometimes takes 3 oz and sometimes stops and seems uncomfortable. Slides down.   Sometimes arches and hard to get back on.  non- bilious and non-bloody emesis. Mom has her on probiotics and she continues to spit up. She seems ok when it comes out, sometimes mom does not even notice when she spits up, squirming sometimes but not crying.   Sleeping well.  She is in the bassinet.  Rolls to her sides, no over. Beech nut oatmeal.  Stooling every other day, soft and mushy. There is no blood or mucous in the stool.  Good weight gain

## 2025-05-19 NOTE — ASSESSMENT
[FreeTextEntry1] : 4-month-old female here for follow-up of reflux.  She is an overall very good eater though family notes at the end of some of the feeds she has arching and pulls off the bottle and is fussy.  She has excellent weight gain which is reassuring and is able to eat the majority of her feeds without difficulty.  We discussed that there are side effects of medications that they typically do not resolve reflux and we could try either changing formula to something more hypoallergenic or thickening the feeds to decrease reflux while we are waiting for her to grow out of it.  Recommend; - Can try thickening formula with oatmeal cereal, 1 teaspoon per 2 ounces and increase to a max of 1 tablespoon per 2 ounces - Reflux precautions discussed - Had a long discussion about complementary feeding and introduction of solids - Family instructed to call for lab results or if any questions or concerns. Family was asked to make a follow-up visit to be seen if not improving over the next few months

## 2025-05-19 NOTE — HISTORY OF PRESENT ILLNESS
[de-identified] : This is a 4-month-old female here for follow-up of reflux.  BW was 6.14 lbs. No issues with the pregnancy or delivery. Elective CS.  FT. IVF pregnancy  Mom recalls the baby passed meconium on the first day of life. Was on enfamil initially and she was spiting up often and had some reflux. Changed to Kendamil and maybe a little more agitated, but not spitting up as much, smaller volume.   She is here for a follow-up visit.  There were a few days she spit up with every meal.  She is still spitting up.  Seems uncomfortable with eating.  She is on kendamil formula and is eating well. She is random with her eating, sometimes takes 3 oz and sometimes stops and seems uncomfortable. Slides down.   Sometimes arches and hard to get back on.  non- bilious and non-bloody emesis. Mom has her on probiotics and she continues to spit up. She seems ok when it comes out, sometimes mom does not even notice when she spits up, squirming sometimes but not crying.   Sleeping well.  She is in the bassinet.  Rolls to her sides, no over. Beech nut oatmeal.  Stooling every other day, soft and mushy. There is no blood or mucous in the stool.  Good weight gain

## 2025-05-19 NOTE — PHYSICAL EXAM
[Well Developed] : well developed [Well Nourished] : well nourished [NAD] : in no acute distress [Alert and Active] : alert and active [PERRL] : pupils were equal, round, reactive to light  [Moist & Pink Mucous Membranes] : moist and pink mucous membranes [CTAB] : lungs clear to auscultation bilaterally [Regular Rate and Rhythm] : regular rate and rhythm [Normal S1, S2] : normal S1 and S2 [Soft] : soft  [Normal Bowel Sounds] : normal bowel sounds [No HSM] : no hepatosplenomegaly appreciated [Normal Tone] : normal tone [Well-Perfused] : well-perfused [Interactive] : interactive [Appropriate Behavior] : appropriate behavior [de-identified] : Soft umbilical hernia, reducible [icteric] : anicteric [Respiratory Distress] : no respiratory distress  [Wheeze] : no wheezing  [Distended] : non distended [Tender] : non tender [Stool Palpable] : no stool palpable [Mass ___ cm] : no masses were palpated [Edema] : no edema [Cyanosis] : no cyanosis [Rash] : no rash [Jaundice] : no jaundice

## 2025-05-19 NOTE — PHYSICAL EXAM
[Well Developed] : well developed [Well Nourished] : well nourished [NAD] : in no acute distress [Alert and Active] : alert and active [PERRL] : pupils were equal, round, reactive to light  [Moist & Pink Mucous Membranes] : moist and pink mucous membranes [CTAB] : lungs clear to auscultation bilaterally [Regular Rate and Rhythm] : regular rate and rhythm [Normal S1, S2] : normal S1 and S2 [Soft] : soft  [Normal Bowel Sounds] : normal bowel sounds [No HSM] : no hepatosplenomegaly appreciated [Normal Tone] : normal tone [Well-Perfused] : well-perfused [Interactive] : interactive [Appropriate Behavior] : appropriate behavior [de-identified] : Soft umbilical hernia, reducible [icteric] : anicteric [Respiratory Distress] : no respiratory distress  [Wheeze] : no wheezing  [Distended] : non distended [Tender] : non tender [Stool Palpable] : no stool palpable [Mass ___ cm] : no masses were palpated [Edema] : no edema [Cyanosis] : no cyanosis [Rash] : no rash [Jaundice] : no jaundice

## 2025-07-09 NOTE — DEVELOPMENTAL MILESTONES
[Pats or smiles at reflection] : pats or smiles at reflection [Babbles] : babbles [Rolls over prone to supine] : rolls over prone to supine [Sits briefly without support] : sits briefly without support [Reaches for object and transfers] : reaches for object and transfers [Rakes small object with 4 fingers] : rakes small object with 4 fingers [[place] small object on surface] : bangs small object on surface [Passed] : passed [Begins to turn when name called] : does not begin to turn when name called

## 2025-07-09 NOTE — PHYSICAL EXAM
[Alert] : alert [Playful] : playful [Normocephalic] : normocephalic [Flat Open Anterior [place]] : flat open anterior fontanelle [Red Reflex] : red reflex bilateral [PERRL] : PERRL [Normally Placed Ears] : normally placed ears [Auricles Well Formed] : auricles well formed [Clear Tympanic membranes] : clear tympanic membranes [Light reflex present] : light reflex present [Bony landmarks visible] : bony landmarks visible [Nares Patent] : nares patent [Palate Intact] : palate intact [Uvula Midline] : uvula midline [Supple, full passive range of motion] : supple, full passive range of motion [Symmetric Chest Rise] : symmetric chest rise [Clear to Auscultation Bilaterally] : clear to auscultation bilaterally [Regular Rate and Rhythm] : regular rate and rhythm [S1, S2 present] : S1, S2 present [+2 Femoral Pulses] : (+) 2 femoral pulses [Soft] : soft [Bowel Sounds] : bowel sounds present [Normal External Genitalia] : normal external genitalia [Normal Vaginal Introitus] : normal vaginal introitus [Patent] : patent [Normally Placed] : normally placed [No Abnormal Lymph Nodes Palpated] : no abnormal lymph nodes palpated [Symmetric Buttocks Creases] : symmetric buttocks creases [Plantar Grasp] : plantar grasp reflex present [Cranial Nerves Grossly Intact] : cranial nerves grossly intact [Murmurs] : murmurs [Nevus Flammeus] : nevus flammeus [Acute Distress] : no acute distress [Discharge] : no discharge [Tooth Eruption] : no tooth eruption [Palpable Masses] : no palpable masses [Tender] : nontender [Distended] : nondistended [Hepatomegaly] : no hepatomegaly [Splenomegaly] : no splenomegaly [Clitoromegaly] : no clitoromegaly [Lebron-Ortolani] : negative Lebron-Ortolani [Allis Sign] : negative Allis sign [Spinal Dimple] : no spinal dimple [Tuft of Hair] : no tuft of hair [Rash or Lesions] : no rash/lesions [de-identified] : drinking formula bottle in office  [de-identified] : [place]

## 2025-07-09 NOTE — DISCUSSION/SUMMARY
[Normal Growth] : growth [Normal Development] : development [None] : No medical problems [No Elimination Concerns] : elimination [No Feeding Concerns] : feeding [No Skin Concerns] : skin [Normal Sleep Pattern] : sleep [No Medications] : ~He/She~ is not on any medications [Parent/Guardian] : parent/guardian [] : The components of the vaccine(s) to be administered today are listed in the plan of care. The disease(s) for which the vaccine(s) are intended to prevent and the risks have been discussed with the caretaker.  The risks are also included in the appropriate vaccination information statements which have been provided to the patient's caregiver.  The caregiver has given consent to vaccinate. [FreeTextEntry1] : The components of today's vaccine(s) include PENTACEL AND ROTAVIRUS    REVIEW BABY'S GROWTH AND DEVELOPMENT- NORMAL ANSWER PARENTS QUESTIONS AND ADDRESS THEIR CONCERNS- increase solid food feeds to 2x day- then advance to 3 meals a day- at that time intro meats and dairy, small soft texture  etc feeding sheet given  baby can have her ears pierced RETURN for next scheduled WELL VISIT

## 2025-07-09 NOTE — HISTORY OF PRESENT ILLNESS
[Mother] : mother [Well-balanced] : well-balanced [Fruits] : fruits [Vegetables] : vegetables [Cereal] : cereal [Normal] : Normal [In Bassinet/Crib] : sleeps in bassinet/crib [On back] : sleeps on back [Sleeps 12-16 hours per 24 hours (including naps)] : sleeps 12-16 hours per 24 hours (including naps) [Tummy time] : tummy time [No] : No cigarette smoke exposure [Water heater temperature set at <120 degrees F] : Water heater temperature set at <120 degrees F [Rear facing car seat in back seat] : Rear facing car seat in back seat [Carbon Monoxide Detectors] : Carbon monoxide detectors at home [Smoke Detectors] : Smoke detectors at home. [Father] : father [Formula ___ oz/feed] : [unfilled] oz of formula per feed [___ Feeding per 24 hrs] : a  total of [unfilled] feedings in 24 hours [Frequency of stools: ___] : Frequency of stools: [unfilled]  stools [every other day] : every other day. 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